# Patient Record
Sex: FEMALE | Race: WHITE | NOT HISPANIC OR LATINO | Employment: FULL TIME | URBAN - METROPOLITAN AREA
[De-identification: names, ages, dates, MRNs, and addresses within clinical notes are randomized per-mention and may not be internally consistent; named-entity substitution may affect disease eponyms.]

---

## 2020-12-04 ENCOUNTER — OFFICE VISIT - HEALTHEAST (OUTPATIENT)
Dept: FAMILY MEDICINE | Facility: CLINIC | Age: 35
End: 2020-12-04

## 2020-12-04 ENCOUNTER — COMMUNICATION - HEALTHEAST (OUTPATIENT)
Dept: TELEHEALTH | Facility: CLINIC | Age: 35
End: 2020-12-04

## 2020-12-04 DIAGNOSIS — N89.8 VAGINAL DISCHARGE: ICD-10-CM

## 2020-12-04 LAB
CLUE CELLS: NORMAL
TRICHOMONAS, WET PREP: NORMAL
YEAST, WET PREP: NORMAL

## 2020-12-04 RX ORDER — SERTRALINE HYDROCHLORIDE 100 MG/1
100 TABLET, FILM COATED ORAL DAILY
Status: SHIPPED | COMMUNITY
Start: 2020-12-04 | End: 2022-04-15

## 2020-12-04 ASSESSMENT — MIFFLIN-ST. JEOR: SCORE: 1373.11

## 2021-04-07 ENCOUNTER — OFFICE VISIT - HEALTHEAST (OUTPATIENT)
Dept: FAMILY MEDICINE | Facility: CLINIC | Age: 36
End: 2021-04-07

## 2021-04-07 DIAGNOSIS — R30.0 DYSURIA: ICD-10-CM

## 2021-04-07 DIAGNOSIS — Z11.3 SCREEN FOR STD (SEXUALLY TRANSMITTED DISEASE): ICD-10-CM

## 2021-04-07 DIAGNOSIS — L21.0 PITYRIASIS IN ADULT: ICD-10-CM

## 2021-04-07 LAB
CLUE CELLS: NORMAL
TRICHOMONAS, WET PREP: NORMAL
YEAST, WET PREP: NORMAL

## 2021-04-07 RX ORDER — DEXTROAMPHETAMINE SACCHARATE, AMPHETAMINE ASPARTATE MONOHYDRATE, DEXTROAMPHETAMINE SULFATE AND AMPHETAMINE SULFATE 5; 5; 5; 5 MG/1; MG/1; MG/1; MG/1
CAPSULE, EXTENDED RELEASE ORAL
Status: SHIPPED | COMMUNITY
Start: 2020-04-21 | End: 2022-04-15

## 2021-04-07 RX ORDER — CLONAZEPAM 1 MG/1
TABLET ORAL
Status: SHIPPED | COMMUNITY
Start: 2020-04-09 | End: 2022-04-15

## 2021-04-09 LAB
C TRACH DNA SPEC QL PROBE+SIG AMP: POSITIVE
N GONORRHOEA DNA SPEC QL NAA+PROBE: POSITIVE

## 2021-04-10 ENCOUNTER — COMMUNICATION - HEALTHEAST (OUTPATIENT)
Dept: SCHEDULING | Facility: CLINIC | Age: 36
End: 2021-04-10

## 2021-04-10 ENCOUNTER — COMMUNICATION - HEALTHEAST (OUTPATIENT)
Dept: FAMILY MEDICINE | Facility: CLINIC | Age: 36
End: 2021-04-10

## 2021-04-10 DIAGNOSIS — Z11.3 SCREEN FOR STD (SEXUALLY TRANSMITTED DISEASE): ICD-10-CM

## 2021-04-10 RX ORDER — DOXYCYCLINE 100 MG/1
100 CAPSULE ORAL 2 TIMES DAILY
Qty: 20 CAPSULE | Refills: 0 | Status: SHIPPED | OUTPATIENT
Start: 2021-04-10 | End: 2022-04-15

## 2021-04-30 ENCOUNTER — AMBULATORY - HEALTHEAST (OUTPATIENT)
Dept: NURSING | Facility: CLINIC | Age: 36
End: 2021-04-30

## 2021-05-17 ENCOUNTER — RECORDS - HEALTHEAST (OUTPATIENT)
Dept: ADMINISTRATIVE | Facility: OTHER | Age: 36
End: 2021-05-17

## 2021-05-18 ENCOUNTER — RECORDS - HEALTHEAST (OUTPATIENT)
Dept: SCHEDULING | Facility: CLINIC | Age: 36
End: 2021-05-18

## 2021-06-05 VITALS
DIASTOLIC BLOOD PRESSURE: 60 MMHG | SYSTOLIC BLOOD PRESSURE: 120 MMHG | WEIGHT: 124 LBS | OXYGEN SATURATION: 97 % | HEART RATE: 123 BPM | BODY MASS INDEX: 19.42 KG/M2

## 2021-06-05 VITALS
HEART RATE: 112 BPM | OXYGEN SATURATION: 92 % | WEIGHT: 141.2 LBS | BODY MASS INDEX: 22.16 KG/M2 | SYSTOLIC BLOOD PRESSURE: 132 MMHG | HEIGHT: 67 IN | DIASTOLIC BLOOD PRESSURE: 74 MMHG

## 2021-06-13 NOTE — PROGRESS NOTES
"ASSESSMENT:  1. Vaginal discharge    Reassured her there is no evidence of a bacterial infection.  We did discuss the possiblity of a foreign body/retained tampon.  We offered to explore for that, but patient wants to go home and do this herself.  If she is continuing to have problems and can't find anything there, she will let us know or come back.    - Wet Prep, Vaginal          PLAN:  There are no Patient Instructions on file for this visit.    Orders Placed This Encounter   Procedures     Wet Prep, Vaginal     There are no discontinued medications.    No follow-ups on file.    CHIEF COMPLAINT:  Chief Complaint   Patient presents with     Vaginal Discharge     odor, cloudy urine, extra discharge - greyish - x1-2 weeks - had gone away for a bit and now back - tried a one day monostat OTC       HISTORY OF PRESENT ILLNESS:  Lala is a 35 y.o. female presenting to the clinic today with concerns of a vaginal discharge.  She has had a grey thin discharge and a lot of odor over the last few days.  No pain noted.  No dysuria or frequency of urination.  No hematuria.  She tried some otc monistat without help.    REVIEW OF SYSTEMS:     All other systems are negative.    PFSH:    Reviewed      TOBACCO USE:  Social History     Tobacco Use   Smoking Status Never Smoker   Smokeless Tobacco Never Used       VITALS:  Vitals:    12/04/20 1412   BP: 132/74   Patient Site: Left Arm   Patient Position: Sitting   Cuff Size: Adult Regular   Pulse: (!) 112   SpO2: 92%   Weight: 141 lb 3.2 oz (64 kg)   Height: 5' 7\" (1.702 m)     Wt Readings from Last 3 Encounters:   12/04/20 141 lb 3.2 oz (64 kg)     Body mass index is 22.12 kg/m .    PHYSICAL EXAM:  Constitutional:  Well appearing patient in no acute distress.  Vitals:  Per nursing notes.    HEENT:  Normocephalic, atraumatic.  Ears are clear bilaterally, with no fluid or redness, and landmarks visible.  Pupils are equal and reactive to light, extraocular muscles intact, visual " fields are full.  Nose is normal, and oropharynx is clear without redness.    Neck is without lymphadenopathy.    Lungs:  Clear to auscultation bilaterally without wheezes, rales or rhonchi.   Cardiac:  Regular rate and rhythm without murmurs, rubs, or gallops.     .  Recent Results (from the past 120 hour(s))   Wet Prep, Vaginal    Collection Time: 12/04/20  2:25 PM    Specimen: Genital   Result Value Ref Range    Yeast Result No yeast seen No yeast seen    Trichomonas No Trichomonas seen No Trichomonas seen    Clue Cells, Wet Prep No Clue cells seen No Clue cells seen          MEDICATIONS:  Current Outpatient Medications   Medication Sig Dispense Refill     dextroamphetamine-amphetamine (ADDERALL XR) 20 MG 24 hr capsule Take 20 mg by mouth daily.       sertraline (ZOLOFT) 100 MG tablet Take 100 mg by mouth daily.       No current facility-administered medications for this visit.

## 2021-06-16 NOTE — TELEPHONE ENCOUNTER
4/7/2 pt seen by Dr. Trinidad in clinic.  Pt calling back that her test results are positive for Chlamydia and Gonorrheae and pt calling for treatment.  On call paged @ 12:28 pm.  On call re paged @ Dr. Fernando Leyva returned page @ 1:03 pm  Rx Zpac and Doxycycline sent to Pharmacy.  Pt to get a hold of all contacts to inform.  Pt to f/u with clinic if any persisting symptoms.  Pt to refrain from intercourse until treatment completed.  This information called to pt.  Erna Ann RN, MA  [facility]    Triage Nurse Advisor

## 2021-06-16 NOTE — TELEPHONE ENCOUNTER
Patient calling reporting she saw her lab results for Chlamydia and Gonorrhoeae. Patient notes she is positive for both STD. Given that is late at night, RN advised patient to call back in the morning for an on call provider for her treatment plan. Verbalized understanding.     Azael Singh RN  [facility] Nurse Advisors

## 2021-06-16 NOTE — PROGRESS NOTES
Lala was seen today for vaginal discharge.    Diagnoses and all orders for this visit:    Pityriasis in adult:  Discussed this diagnosis in detail.  It is not bothersome currently.  I discussed that it should not further spread, but resolve on its own over time.    Screen for STD (sexually transmitted disease)  -     Chlamydia trachomatis & Neisseria gonorrhoeae, Amplified Detection  -     Wet Prep, Vaginal  I discussed STDs that could be tested for today.  Patient declines blood work for HIV, hepatitis C, and syphilis today.  She would like gonorrhea and Chlamydia testing.  Will await results and notify patient.      Subjective   Lala Calvin is 35 y.o. and presents today for the following health issues   HPI   1)  Rash up her legs x 2 weeks.  She had a virtual visit 1.5 weeks ago and got prednisone.  Did not do anything.  Does not really itch.  No new soaps lotions or creams.  She stopped using bubble bath.  Started with a patch left inner thigh and spread from there.  Mostly on the extensor surface.  Has not spread since.  Not bothersome other than appearance.    2) STD screening.  Patient got  in September and recently moved.  She has been sexually active and would like STD testing.  She has only a little bit of vaginal discharge.  She is not had any lesions.  When asked about birth control, patient notes that she was diagnosed with polycystic ovarian syndrome and female infertility.  She is not used any birth control.  She was seen by [facility] for her gynecologic care in the past.  She notes that her last Pap smear was in 2019 and was normal.      Objective    /60   Pulse (!) 123   Wt 124 lb (56.2 kg)   SpO2 97%   BMI 19.42 kg/m    Body mass index is 19.42 kg/m .  Physical Exam  Gen: A+O x3,NAD  GYNE: Normal appearing external genitalia without lesions.  Vagina reveals only scant amount of discharge.  Cervix is normal-appearing.  No tenderness to palpation, no adnexal tenderness, no cervical  motion tenderness.    Microscopic wet-mount exam shows: negative for yeast, trichomonas, clue cells.

## 2021-06-17 NOTE — TELEPHONE ENCOUNTER
RN cannot approve Refill Request    RN can NOT refill this medication No established PCP. Last office visit: 12/4/2020 Fernando Leyva MD Last Physical: Visit date not found Last MTM visit: Visit date not found Last visit same specialty: Visit date not found.  Next visit within 3 mo: Visit date not found  Next physical within 3 mo: Visit date not found      Erna Ann, [facility] Triage/Med Refill 5/18/2021    Requested Prescriptions   Pending Prescriptions Disp Refills     doxycycline (MONODOX) 100 MG capsule 20 capsule 0     Sig: Take 1 capsule (100 mg total) by mouth 2 (two) times a day.       Tetracycline/Minocycline/Doxycycline Refill Protocol Failed - 5/17/2021 11:11 PM        Failed - CBC in last year     No results found for: WBC, HGB, HCT, PLT          Failed - AST & Alkaline Phosphatase in last year     No results found for: AST, ALKPHOS            Failed - BUN or Serum Creatinine in last year     No results found for: BUN, CREATININE             Passed - PCP or prescribing provider visit in last year     Last office visit with prescriber/PCP: 12/4/2020 Fernando Leyva MD OR same dept: Visit date not found OR same specialty: Visit date not found  Last physical: Visit date not found Last MTM visit: Visit date not found   Next visit within 3 mo: Visit date not found  Next physical within 3 mo: Visit date not found  Prescriber OR PCP: Fernando Leyva MD  Last diagnosis associated with med order: 1. Screen for STD (sexually transmitted disease)  - doxycycline (MONODOX) 100 MG capsule; Take 1 capsule (100 mg total) by mouth 2 (two) times a day.  Dispense: 20 capsule; Refill: 0    If protocol passes may refill for 12 months if within 3 months of last provider visit (or a total of 15 months).

## 2021-08-03 ENCOUNTER — E-VISIT (OUTPATIENT)
Dept: FAMILY MEDICINE | Facility: CLINIC | Age: 36
End: 2021-08-03
Payer: COMMERCIAL

## 2021-08-03 DIAGNOSIS — Z30.09 ENCOUNTER FOR OTHER GENERAL COUNSELING OR ADVICE ON CONTRACEPTION: Primary | ICD-10-CM

## 2021-08-03 PROCEDURE — 99207 PR NON-BILLABLE SERV PER CHARTING: CPT | Performed by: FAMILY MEDICINE

## 2021-08-05 NOTE — PATIENT INSTRUCTIONS
Thank you for choosing us for your care. I think an in-clinic visit would be best next steps based on your symptoms. Please schedule a clinic appointment; you won t be charged for this eVisit.      You can schedule an appointment right here in [facility], or call [phone]

## 2021-08-21 ENCOUNTER — E-VISIT (OUTPATIENT)
Dept: URGENT CARE | Facility: CLINIC | Age: 36
End: 2021-08-21
Payer: COMMERCIAL

## 2021-08-21 ENCOUNTER — HEALTH MAINTENANCE LETTER (OUTPATIENT)
Age: 36
End: 2021-08-21

## 2021-08-21 DIAGNOSIS — N39.0 ACUTE UTI (URINARY TRACT INFECTION): Primary | ICD-10-CM

## 2021-08-21 PROCEDURE — 99421 OL DIG E/M SVC 5-10 MIN: CPT | Performed by: PHYSICIAN ASSISTANT

## 2021-08-21 RX ORDER — NITROFURANTOIN 25; 75 MG/1; MG/1
100 CAPSULE ORAL 2 TIMES DAILY
Qty: 10 CAPSULE | Refills: 0 | Status: SHIPPED | OUTPATIENT
Start: 2021-08-21 | End: 2021-08-26

## 2021-08-21 NOTE — PATIENT INSTRUCTIONS
Dear Lala Calvin    After reviewing your responses, I've been able to diagnose you with a urinary tract infection, which is a common infection of the bladder with bacteria.  This is not a sexually transmitted infection, though urinating immediately after intercourse can help prevent infections.  Drinking lots of fluids is also helpful to clear your current infection and prevent the next one.      I have sent a prescription for antibiotics to your pharmacy to treat this infection.    It is important that you take all of your prescribed medication even if your symptoms are improving after a few doses.  Taking all of your medicine helps prevent the symptoms from returning.     If your symptoms worsen, you develop pain in your back or stomach, develop fevers, or are not improving in 5 days, please contact your primary care provider for an appointment or visit any of our convenient Walk-in or Urgent Care Centers to be seen, which can be found on our website here.    Thanks again for choosing us as your health care partner,    Kary Reese PA-C, ANGELINE

## 2021-09-05 ENCOUNTER — HEALTH MAINTENANCE LETTER (OUTPATIENT)
Age: 36
End: 2021-09-05

## 2021-10-10 ENCOUNTER — NURSE TRIAGE (OUTPATIENT)
Dept: NURSING | Facility: CLINIC | Age: 36
End: 2021-10-10

## 2021-10-10 NOTE — TELEPHONE ENCOUNTER
Stanley (father) calls and says that he wants to know if there are beds available at [facility] on the Detox unit. RN then told Stanley that he needs to call the main # to the [facility] and ask the  to transfer you to the Detox floor, for further assistance . Stanlye says that he will do this. RN gave Stanley the phone # to the [facility]. COVID 19 Nurse Triage Plan/Patient Instructions    Please be aware that novel coronavirus (COVID-19) may be circulating in the community. If you develop symptoms such as fever, cough, or SOB or if you have concerns about the presence of another infection including coronavirus (COVID-19), please contact your health care provider or visit [email or web address].     Disposition/Instructions    Home care recommended. Follow home care protocol based instructions.    Thank you for taking steps to prevent the spread of this virus.  o Limit your contact with others.  o Wear a simple mask to cover your cough.  o Wash your hands well and often.    Resources    [facility]: About COVID-19: [email or web address]    CDC: What to Do If You're Sick: www.cdc.gov/coronavirus/2019-ncov/about/steps-when-sick.html    CDC: Ending Home Isolation: www.cdc.gov/coronavirus/2019-ncov/hcp/disposition-in-home-patients.html     CDC: Caring for Someone: www.cdc.gov/coronavirus/2019-ncov/if-you-are-sick/care-for-someone.html     [facility]: Interim Guidance for Hospital Discharge to Home: www.health.[facility].mn.us/diseases/coronavirus/hcp/hospdischarge.pdf    [facility] clinical trials (COVID-19 research studies): [email or web address]     Below are the COVID-19 hotlines at the [facility] ([facility]). Interpreters are available.   o For health questions: Call 863-484-3962 or 1-225.169.7676 (7 a.m. to 7 p.m.)  o For questions about schools and childcare: Call 480-545-3450 or 1-587.596.5153 (7 a.m. to 7 p.m.)                   Reason  for Disposition    General information question, no triage required and triager able to answer question    Additional Information    Negative: [1] Caller is not with the adult (patient) AND [2] reporting urgent symptoms    Negative: Lab result questions    Negative: Medication questions    Negative: Caller can't be reached by phone    Negative: Caller has already spoken to PCP or another triager    Negative: RN needs further essential information from caller in order to complete triage    Negative: Requesting regular office appointment    Negative: [1] Caller requesting NON-URGENT health information AND [2] PCP's office is the best resource    Negative: Health Information question, no triage required and triager able to answer question    Protocols used: INFORMATION ONLY CALL - NO TRIAGE-A-

## 2021-10-16 ENCOUNTER — E-VISIT (OUTPATIENT)
Dept: URGENT CARE | Facility: CLINIC | Age: 36
End: 2021-10-16
Payer: COMMERCIAL

## 2021-10-16 DIAGNOSIS — B35.3 TINEA PEDIS OF BOTH FEET: Primary | ICD-10-CM

## 2021-10-16 PROCEDURE — 99421 OL DIG E/M SVC 5-10 MIN: CPT | Performed by: PHYSICIAN ASSISTANT

## 2021-10-16 RX ORDER — PRENATAL VIT 91/IRON/FOLIC/DHA 28-975-200
COMBINATION PACKAGE (EA) ORAL 2 TIMES DAILY
Qty: 30 G | Refills: 1 | Status: SHIPPED | OUTPATIENT
Start: 2021-10-16 | End: 2022-04-15

## 2021-10-16 NOTE — PATIENT INSTRUCTIONS
"  Dear Lala Calvin    After reviewing your responses, I've been able to diagnose you with \"tinea\" which is a common skin condition caused by a fungal infection. There are many common names for this depending on where it is located and it's appearance (jock itch, athlete's foot, ringworm, etc).  Based on your responses, I have prescribed terbinafine to treat this. Please follow the instructions on the medication. If you experience irritation of your skin, new rash, or any other new symptoms, you should stop using this medication and contact your primary care provider.  If this treatment does not work for you in 2 weeks or after completing treatment, please plan to follow-up with your primary care provider to evaluate in-person.  Self-care:  Wash all items that come into contact with infected skin: Wash all towels, clothes, and bedding in hot water. Use laundry soap. Clean shower stalls, mats, and floors with a germ-killing or fungus-killing .   Do not share personal items: Do not share towels, brushes, nick, or hair accessories.    Keep your skin, hair, and nails clean and dry: Bathe every day, and dry your skin before you put medicine on the infected area. Wash your hands often. Do not scratch your sores. This may cause the infection to spread.   Avoid infected pets: A patch of missing fur is a sign of infection in a pet. Take your infected pet to a  for treatment.  Contact your primary healthcare provider if:  You have a fever.    Your infection continues to spread after 7 days of treatment.   Your rash is not gone in 2 weeks.   The area around your rash becomes red, warm, tender, swollen, or smells bad.   You have questions or concerns about your condition or care.    Thanks for choosing?us?as your health care partner,    Mayela Garcia PA-C  "

## 2021-11-23 ENCOUNTER — NURSE TRIAGE (OUTPATIENT)
Dept: NURSING | Facility: CLINIC | Age: 36
End: 2021-11-23
Payer: COMMERCIAL
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
## 2022-04-14 NOTE — PROGRESS NOTES
"[facility] PHYSICAL EXAMINATION FORM    Patient: Lala Calvin    YOB: 1985  Sex:  female  Date of Exam: April/15/2022   MyChart Status: Active    Arrival Time: 01 34 PM  Departure Time: 03 00 PM    Vitals: /57   Pulse 76   Temp 98.1  F (36.7  C) (Oral)   Ht 1.707 m (5' 7.2\")   Wt 59 kg (130 lb)   SpO2 100%   BMI 20.24 kg/m      Patient Concerns:   Chief Complaint   Patient presents with     Physical     HPI    Lala Calvin presents to the clinic today for a [facility] physical exam. They have been there since 3/15/22. This is not the patient's first time in treatment. Reports their drug of choice is/was alcohol , heroin  and methamphetamine, last used 3/9/22. Patient endorses a history of IVDU and denies a history of incarceration. Patient's living situation prior to treatment: lives alone. Patient denies any recent travel. Report their last episode of [facility] was 3/8/22, no active genitourinary symptoms. Lala Calvin denies current chest pain, palpitations, SOB, cough, fever, chills, malaise, N/V/D, night sweats, unintentional weight loss, or abdominal pain.     Current health complaints/updates:   - Requesting prescriptions for over-the-counter medications including ibuprofen for intermittent tension headaches, melatonin for insomnia, and a multivitamin for general wellness  - OB/GYN appointment scheduled for 4/24 for pap, breast exam, & f/u on PCOS hx  - Due for second dose of Tdap today, would like to complete  - Otherwise feeling in good state of health  - Psychiatric care and medication management by psychiatrist at [facility]. Patient reports she would like to wean off of trazodone eventually to reduce amount of medications taken daily. Has been taking 50 mg daily for insomnia. Would like to try melatonin.     Allergies:   Allergies   Allergen Reactions     No Known Drug Allergies      Ragweeds      Seasonal Allergies      Medications:   Current Outpatient " Medications   Medication Sig Dispense Refill     atomoxetine (STRATTERA) 60 MG capsule Take 60 mg by mouth every morning       buprenorphine HCl-naloxone HCl (SUBOXONE) 8-2 MG per film Place 1 Film under the tongue daily Dissolve 1 film under the tongue everyday at noon and dissolve 1/film under the tongue every night at bedtime       escitalopram (LEXAPRO) 20 MG tablet Take 20 mg by mouth daily       gabapentin (NEURONTIN) 300 MG capsule Take 300 mg by mouth 2 times daily Take 1 capsule by mouth twice a day (4pm and every night at bedtime)       ibuprofen (ADVIL/MOTRIN) 200 MG capsule Take 1-3 capsules (200-600 mg) by mouth every 4 hours as needed for mild pain, moderate pain or inflammatory pain 90 capsule 3     melatonin 3 MG tablet Take 1-2 tablets (3-6 mg) by mouth nightly as needed for sleep 90 tablet 3     multivitamin, therapeutic (MULTIVITAMIN) TABS tablet Take 1 tablet by mouth daily 90 tablet 3     nicotine (NICODERM CQ) 21 MG/24HR 24 hr patch Place 1 patch onto the skin every 24 hours Apply 1 patch to skin daily, remove at bedtime       polyethylene glycol 3350 POWD daily as needed Mix 34 grams with 8 ounces of fluid and drink daily as needed       senna (SENOKOT) 8.6 MG tablet Take 1 tablet by mouth 2 times daily as needed for constipation       traZODone (DESYREL) 50 MG tablet Take 0.5-1 tablets (25-50 mg) by mouth nightly as needed for sleep Take 1-2 tablets by mouth every night at bedtime as needed 30 tablet 0     ROS:  Review Of Systems  Skin: negative  Eyes: negative  Ears/Nose/Throat: negative  Respiratory: No shortness of breath, dyspnea on exertion, cough, or hemoptysis  Cardiovascular: negative for, palpitations, chest pain and lower extremity edema  Gastrointestinal: negative  Genitourinary: negative  Musculoskeletal: negative  Neurologic: positive for intermittent headaches, otherwise negative  Psychiatric: positive for insomnia, otherwise negative  Hematologic/Lymphatic/Immunologic:  negative  Endocrine: negative    Past Medical, social, family histories, medications, and allergies reviewed and updated.    General Physical Exam:  Constitutional: Awake, alert, cooperative, no apparent distress, and appears stated age.  Eyes: Lids and lashes normal, pupils equal, round and reactive to light, extra ocular muscles intact, sclera clear, conjunctiva normal.  ENT: Normocephalic, without obvious abnormality, atramatic, sinuses nontender on palpation, external ears without lesions, oral pharynx with moist mucus membranes, tonsils enlarged +3 without erythema or exudates, gums normal and good dentition.  Neck: Supple, symmetrical, trachea midline, no adenopathy, thyroid symmetric, not enlarged and no tenderness, skin normal.  Hematologic / Lymphatic: No cervical lymphadenopathy and no supraclavicular lymphadenopathy.  Back: Symmetric, no curvature, no deformity  Lungs: No increased work of breathing, good air exchange, clear to auscultation bilaterally, no crackles or wheezing.  Cardiovascular: Regular rate and rhythm, normal S1 and S2, no S3 or S4, and no murmur noted.  Chest / Breast: Patient declined, will f/u with OB/GYN  Abdomen: No scars, normal bowel sounds, soft, non-distended, non-tender, no masses palpated, no hepatosplenomegally.  Genitourinary: Patient declined, will f/u with OB/GYN  Musculoskeletal: No redness, warmth, or swelling of the joints.  Full range of motion noted.  Motor strength is 5 out of 5 all extremities bilaterally. Tone is normal.  Neurologic: Awake, alert, oriented to name, place and time. Cranial nerves II-XII are grossly intact.  Motor is 5 out of 5 bilaterally. Sensory is intact. Gait is normal.  Neuropsychiatric: Normal affect, mood, orientation, memory and insight.  Skin: No rashes, erythema, pallor, petechia or purpura. Tattoos present. Multiple piercings present.    All labs required for [facility] will be completed during this visit: HIV, Hepatitis A (IgM &IgG), Hepatitis  B (IgM &IgG), Hepatitis C, Quantiferon Gold, Pregnancy Test     Are there any conditions that may endanger the health of the staff or Clients in our residential program? No     Is there any reason why this applicant should not assist in the preparation of food? No    This applicant is okay to admit for services to [facility]? Yes     The above client is a mutual patient at [facility], and the [facility] would like our patient to continue taking her medication as prescribed upon discharging from [facility].     I authorize the above patient to take all of her medication with her upon discharging from [facility]. Yes     Diagnoses:     (Z00.00) Health maintenance examination  (primary encounter diagnosis)  Comment: Routine screening completed as indicated per USPSTF guidelines & patient's pertinent health risks.  Plan: Comprehensive metabolic panel, CBC with         Platelets & Differential, Lipid panel reflex to        direct LDL Non-fasting, multivitamin,         therapeutic (MULTIVITAMIN) TABS tablet, TDAP         VACCINE (Adacel, Boostrix)  [[id number]]    (Z59.89) Living in temporary quarters  Comment: Screening for communicable diseases given patient living in communal housing  Plan: Quantiferon-TB Gold Plus, Hepatitis A Antibody         IgG, Hepatitis A antibody IgM    (F19.90) IVDU (intravenous drug user)  Comment: Screening for blood-borne pathogens given history of IVDU  Plan: Hepatitis C Screen Reflex to HCV RNA Quant and         Genotype, Hepatitis B core antibody, Hepatitis         B surface antigen, Hepatitis B Surface Antibody    (Z11.3) Routine screening for STI (sexually transmitted infection)  Comment: Screening for STI given recent [facility] and personal hx of STI  Plan: HIV Antigen Antibody Combo, Treponema Abs w         Reflex to RPR and Titer, NEISSERIA GONORRHOEA          PCR, CHLAMYDIA TRACHOMATIS PCR    (Z72.51) Unprotected sexual intercourse  Comment: Patient has hx of PCOS and ED, does not get regular menses. Will screen for pregnancy today.   Plan: HCG qualitative urine    (G47.00) Insomnia, unspecified type  Comment: Patient expressed that she would like to wean off of trazodone. Requesting melatonin prescription. Changed trazodone from  HS PRN to 25-50 HS PRN - given 30-day amount and instructed patient to follow-up with psychiatrist for further management (patient established with [facility]).  Plan: melatonin 3 MG tablet, traZODone (DESYREL) 50         MG tablet    (G44.219) Episodic tension-type headache, not intractable  Comment: Patient gets headaches intermittently. Responds well to ibuprofen and requesting prescription due to requiring Rx for OTC medications while living in treatment facility.   Plan: ibuprofen (ADVIL/MOTRIN) 200 MG capsule    Medication Changes:   MEDICATIONS:   Orders Placed This Encounter   Medications     DISCONTD: traZODone (DESYREL) 50 MG tablet     Sig: Take 50 mg by mouth nightly as needed for sleep Take 1-2 tablets by mouth every night at bedtime as needed     ibuprofen (ADVIL/MOTRIN) 200 MG capsule     Sig: Take 1-3 capsules (200-600 mg) by mouth every 4 hours as needed for mild pain, moderate pain or inflammatory pain     Dispense:  90 capsule     Refill:  3     melatonin 3 MG tablet     Sig: Take 1-2 tablets (3-6 mg) by mouth nightly as needed for sleep     Dispense:  90 tablet     Refill:  3     multivitamin, therapeutic (MULTIVITAMIN) TABS tablet     Sig: Take 1 tablet by mouth daily     Dispense:  90 tablet     Refill:  3     traZODone (DESYREL) 50 MG tablet     Sig: Take 0.5-1 tablets (25-50 mg) by mouth nightly as needed for sleep Take 1-2 tablets by mouth every night at bedtime as needed     Dispense:  30 tablet     Refill:  0          - Continue other medications without change    Referrals   NO REFERRALS MADE  TODAY    Follow up plan   Follow up as needed.      Bharati Morales, SANTOS CNP     Fax completed forms to: [phone]

## 2022-04-15 ENCOUNTER — OFFICE VISIT (OUTPATIENT)
Dept: FAMILY MEDICINE | Facility: CLINIC | Age: 37
End: 2022-04-15
Payer: COMMERCIAL

## 2022-04-15 VITALS
HEART RATE: 76 BPM | TEMPERATURE: 98.1 F | HEIGHT: 67 IN | BODY MASS INDEX: 20.4 KG/M2 | SYSTOLIC BLOOD PRESSURE: 100 MMHG | WEIGHT: 130 LBS | OXYGEN SATURATION: 100 % | DIASTOLIC BLOOD PRESSURE: 57 MMHG

## 2022-04-15 DIAGNOSIS — G44.219 EPISODIC TENSION-TYPE HEADACHE, NOT INTRACTABLE: ICD-10-CM

## 2022-04-15 DIAGNOSIS — G47.00 INSOMNIA, UNSPECIFIED TYPE: ICD-10-CM

## 2022-04-15 DIAGNOSIS — Z00.00 HEALTH MAINTENANCE EXAMINATION: Primary | ICD-10-CM

## 2022-04-15 DIAGNOSIS — Z11.3 ROUTINE SCREENING FOR STI (SEXUALLY TRANSMITTED INFECTION): ICD-10-CM

## 2022-04-15 DIAGNOSIS — Z72.51 UNPROTECTED SEXUAL INTERCOURSE: ICD-10-CM

## 2022-04-15 DIAGNOSIS — Z59.89 LIVING IN TEMPORARY QUARTERS: ICD-10-CM

## 2022-04-15 DIAGNOSIS — F19.90 IVDU (INTRAVENOUS DRUG USER): ICD-10-CM

## 2022-04-15 LAB
ALBUMIN SERPL-MCNC: 3.4 G/DL (ref 3.4–5)
ALP SERPL-CCNC: 45 U/L (ref 40–150)
ALT SERPL W P-5'-P-CCNC: 18 U/L (ref 0–50)
ANION GAP SERPL CALCULATED.3IONS-SCNC: 6 MMOL/L (ref 3–14)
AST SERPL W P-5'-P-CCNC: 19 U/L (ref 0–45)
BASOPHILS # BLD AUTO: 0 10E3/UL (ref 0–0.2)
BASOPHILS NFR BLD AUTO: 1 %
BILIRUB SERPL-MCNC: 0.4 MG/DL (ref 0.2–1.3)
BUN SERPL-MCNC: 9 MG/DL (ref 7–30)
CALCIUM SERPL-MCNC: 9.2 MG/DL (ref 8.5–10.1)
CHLORIDE BLD-SCNC: 102 MMOL/L (ref 94–109)
CHOLEST SERPL-MCNC: 143 MG/DL
CO2 SERPL-SCNC: 30 MMOL/L (ref 20–32)
CREAT SERPL-MCNC: 0.7 MG/DL (ref 0.52–1.04)
EOSINOPHIL # BLD AUTO: 0.1 10E3/UL (ref 0–0.7)
EOSINOPHIL NFR BLD AUTO: 2 %
ERYTHROCYTE [DISTWIDTH] IN BLOOD BY AUTOMATED COUNT: 12.2 % (ref 10–15)
FASTING STATUS PATIENT QL REPORTED: NO
GFR SERPL CREATININE-BSD FRML MDRD: >90 ML/MIN/1.73M2
GLUCOSE BLD-MCNC: 96 MG/DL (ref 70–99)
HCG UR QL: NEGATIVE
HCT VFR BLD AUTO: 34.4 % (ref 35–47)
HDLC SERPL-MCNC: 47 MG/DL
HGB BLD-MCNC: 11 G/DL (ref 11.7–15.7)
IMM GRANULOCYTES # BLD: 0 10E3/UL
IMM GRANULOCYTES NFR BLD: 0 %
LDLC SERPL CALC-MCNC: 69 MG/DL
LYMPHOCYTES # BLD AUTO: 2.1 10E3/UL (ref 0.8–5.3)
LYMPHOCYTES NFR BLD AUTO: 30 %
MCH RBC QN AUTO: 27.4 PG (ref 26.5–33)
MCHC RBC AUTO-ENTMCNC: 32 G/DL (ref 31.5–36.5)
MCV RBC AUTO: 86 FL (ref 78–100)
MONOCYTES # BLD AUTO: 0.6 10E3/UL (ref 0–1.3)
MONOCYTES NFR BLD AUTO: 9 %
NEUTROPHILS # BLD AUTO: 4.1 10E3/UL (ref 1.6–8.3)
NEUTROPHILS NFR BLD AUTO: 58 %
NONHDLC SERPL-MCNC: 96 MG/DL
NRBC # BLD AUTO: 0 10E3/UL
NRBC BLD AUTO-RTO: 0 /100
PLATELET # BLD AUTO: 319 10E3/UL (ref 150–450)
POTASSIUM BLD-SCNC: 3.6 MMOL/L (ref 3.4–5.3)
PROT SERPL-MCNC: 7.8 G/DL (ref 6.8–8.8)
RBC # BLD AUTO: 4.01 10E6/UL (ref 3.8–5.2)
SODIUM SERPL-SCNC: 138 MMOL/L (ref 133–144)
TRIGL SERPL-MCNC: 137 MG/DL
WBC # BLD AUTO: 7 10E3/UL (ref 4–11)

## 2022-04-15 PROCEDURE — 87389 HIV-1 AG W/HIV-1&-2 AB AG IA: CPT | Performed by: NURSE PRACTITIONER

## 2022-04-15 PROCEDURE — 86708 HEPATITIS A ANTIBODY: CPT | Performed by: NURSE PRACTITIONER

## 2022-04-15 PROCEDURE — 87591 N.GONORRHOEAE DNA AMP PROB: CPT | Performed by: NURSE PRACTITIONER

## 2022-04-15 PROCEDURE — 86481 TB AG RESPONSE T-CELL SUSP: CPT | Performed by: NURSE PRACTITIONER

## 2022-04-15 PROCEDURE — 80061 LIPID PANEL: CPT | Performed by: NURSE PRACTITIONER

## 2022-04-15 PROCEDURE — 86803 HEPATITIS C AB TEST: CPT | Performed by: NURSE PRACTITIONER

## 2022-04-15 PROCEDURE — 85025 COMPLETE CBC W/AUTO DIFF WBC: CPT | Performed by: NURSE PRACTITIONER

## 2022-04-15 PROCEDURE — 80053 COMPREHEN METABOLIC PANEL: CPT | Performed by: NURSE PRACTITIONER

## 2022-04-15 PROCEDURE — 86704 HEP B CORE ANTIBODY TOTAL: CPT | Performed by: NURSE PRACTITIONER

## 2022-04-15 PROCEDURE — 87491 CHLMYD TRACH DNA AMP PROBE: CPT | Performed by: NURSE PRACTITIONER

## 2022-04-15 PROCEDURE — 86709 HEPATITIS A IGM ANTIBODY: CPT | Performed by: NURSE PRACTITIONER

## 2022-04-15 PROCEDURE — 86706 HEP B SURFACE ANTIBODY: CPT | Performed by: NURSE PRACTITIONER

## 2022-04-15 PROCEDURE — 86780 TREPONEMA PALLIDUM: CPT | Performed by: NURSE PRACTITIONER

## 2022-04-15 PROCEDURE — 87340 HEPATITIS B SURFACE AG IA: CPT | Performed by: NURSE PRACTITIONER

## 2022-04-15 RX ORDER — OMEGA-3 FATTY ACIDS/FISH OIL 300-1000MG
200-600 CAPSULE ORAL EVERY 4 HOURS PRN
Qty: 90 CAPSULE | Refills: 3 | Status: SHIPPED | OUTPATIENT
Start: 2022-04-15

## 2022-04-15 RX ORDER — POLYETHYLENE GLYCOL 3350
POWDER (GRAM) MISCELLANEOUS DAILY PRN
COMMUNITY

## 2022-04-15 RX ORDER — NICOTINE 21 MG/24HR
1 PATCH, TRANSDERMAL 24 HOURS TRANSDERMAL EVERY 24 HOURS
COMMUNITY
End: 2022-04-22

## 2022-04-15 RX ORDER — ATOMOXETINE 60 MG/1
60 CAPSULE ORAL EVERY MORNING
COMMUNITY
End: 2022-12-13

## 2022-04-15 RX ORDER — SENNOSIDES A AND B 8.6 MG/1
1 TABLET, FILM COATED ORAL 2 TIMES DAILY PRN
COMMUNITY

## 2022-04-15 RX ORDER — GABAPENTIN 300 MG/1
300 CAPSULE ORAL 2 TIMES DAILY
COMMUNITY

## 2022-04-15 RX ORDER — TRAZODONE HYDROCHLORIDE 50 MG/1
25-50 TABLET, FILM COATED ORAL
Qty: 30 TABLET | Refills: 0 | Status: SHIPPED | OUTPATIENT
Start: 2022-04-15

## 2022-04-15 RX ORDER — ESCITALOPRAM OXALATE 20 MG/1
20 TABLET ORAL DAILY
COMMUNITY

## 2022-04-15 RX ORDER — TRAZODONE HYDROCHLORIDE 50 MG/1
50 TABLET, FILM COATED ORAL
COMMUNITY
End: 2022-04-15

## 2022-04-15 RX ORDER — BUPRENORPHINE AND NALOXONE 8; 2 MG/1; MG/1
1 FILM, SOLUBLE BUCCAL; SUBLINGUAL DAILY
COMMUNITY

## 2022-04-15 RX ORDER — MULTIVITAMIN,THERAPEUTIC
1 TABLET ORAL DAILY
Qty: 90 TABLET | Refills: 3 | Status: SHIPPED | OUTPATIENT
Start: 2022-04-15

## 2022-04-15 RX ORDER — LANOLIN ALCOHOL/MO/W.PET/CERES
3-6 CREAM (GRAM) TOPICAL
Qty: 90 TABLET | Refills: 3 | Status: SHIPPED | OUTPATIENT
Start: 2022-04-15

## 2022-04-15 SDOH — ECONOMIC STABILITY - INCOME SECURITY: OTHER PROBLEMS RELATED TO HOUSING AND ECONOMIC CIRCUMSTANCES: Z59.89

## 2022-04-15 ASSESSMENT — PATIENT HEALTH QUESTIONNAIRE - PHQ9
SUM OF ALL RESPONSES TO PHQ QUESTIONS 1-9: 9
5. POOR APPETITE OR OVEREATING: SEVERAL DAYS

## 2022-04-15 ASSESSMENT — ANXIETY QUESTIONNAIRES
1. FEELING NERVOUS, ANXIOUS, OR ON EDGE: SEVERAL DAYS
6. BECOMING EASILY ANNOYED OR IRRITABLE: SEVERAL DAYS
IF YOU CHECKED OFF ANY PROBLEMS ON THIS QUESTIONNAIRE, HOW DIFFICULT HAVE THESE PROBLEMS MADE IT FOR YOU TO DO YOUR WORK, TAKE CARE OF THINGS AT HOME, OR GET ALONG WITH OTHER PEOPLE: SOMEWHAT DIFFICULT
5. BEING SO RESTLESS THAT IT IS HARD TO SIT STILL: SEVERAL DAYS
GAD7 TOTAL SCORE: 6
3. WORRYING TOO MUCH ABOUT DIFFERENT THINGS: SEVERAL DAYS
2. NOT BEING ABLE TO STOP OR CONTROL WORRYING: SEVERAL DAYS
7. FEELING AFRAID AS IF SOMETHING AWFUL MIGHT HAPPEN: NOT AT ALL

## 2022-04-15 NOTE — NURSING NOTE
"ROOM:2    Preferred Name: Lala     36 year old  Chief Complaint   Patient presents with     Physical       Blood pressure 100/57, pulse 76, temperature 98.1  F (36.7  C), temperature source Oral, height 1.707 m (5' 7.2\"), SpO2 100 %. Body mass index is 19.31 kg/m .  BP completed using cuff size:    There is no problem list on file for this patient.      Wt Readings from Last 2 Encounters:   04/07/21 56.2 kg (124 lb)   12/04/20 64 kg (141 lb 3.2 oz)     BP Readings from Last 3 Encounters:   04/15/22 100/57   04/07/21 120/60   12/04/20 132/74       Allergies   Allergen Reactions     No Known Drug Allergies      Ragweeds      Seasonal Allergies        Current Outpatient Medications   Medication     ADDERALL OR     clonazePAM (KLONOPIN) 1 MG tablet     dextroamphetamine-amphetamine (ADDERALL XR) 20 MG 24 hr capsule     doxycycline (MONODOX) 100 MG capsule     KEFLEX 500 MG OR CAPS     propranoloL (INDERAL LA) 60 mg 24 hr capsule     sertraline (ZOLOFT) 100 MG tablet     terbinafine (LAMISIL) 1 % external cream     ZOLOFT OR     No current facility-administered medications for this visit.       Social History     Tobacco Use     Smoking status: Former Smoker     Smokeless tobacco: Never Used     Tobacco comment: 1/2 PPD   Vaping Use     Vaping Use: Never used   Substance Use Topics     Alcohol use: Not Currently     Alcohol/week: 3.0 - 4.0 standard drinks     Comment: former     Drug use: Not Currently     Types: Cocaine, Methamphetamines, Fentanyl, Amphetamines       Honoring Choices - Health Care Directive Guide offered to patient at time of visit.    Health Maintenance Due   Topic Date Due     PREVENTIVE CARE VISIT  Never done     ADVANCE CARE PLANNING  Never done     HIV SCREENING  Never done     HEPATITIS C SCREENING  Never done     PAP  Never done     COVID-19 Vaccine (2 - Pfizer 3-dose series) 05/21/2021       Immunization History   Administered Date(s) Administered     COVID-19,PF,Pfizer (12+ Yrs) 04/30/2021 "     Influenza Vaccine IM > 6 months Valent IIV4 (Alfuria,Fluzone) 12/09/2020       No results found for: PAP    No lab results found.    No flowsheet data found.    PHQ-9 SCORE 4/15/2022   PHQ-9 Total Score 9       EBENEZER-7 SCORE 4/15/2022   Total Score 6       No flowsheet data found.    David Real    April 15, 2022 1:53 PM

## 2022-04-15 NOTE — PATIENT INSTRUCTIONS
It was very nice to meet you today!    Please reach out with any questions or concerns regarding your care.     I will contact you via a phone call or assist in setting up a follow-up appointment to discuss lab results from blood work done today.     Have a shakira weekend    -Bharati

## 2022-04-16 LAB
C TRACH DNA SPEC QL NAA+PROBE: NEGATIVE
N GONORRHOEA DNA SPEC QL NAA+PROBE: NEGATIVE
T PALLIDUM AB SER QL: NONREACTIVE

## 2022-04-16 ASSESSMENT — ANXIETY QUESTIONNAIRES: GAD7 TOTAL SCORE: 6

## 2022-04-17 LAB
GAMMA INTERFERON BACKGROUND BLD IA-ACNC: 0.02 IU/ML
M TB IFN-G BLD-IMP: NEGATIVE
M TB IFN-G CD4+ BCKGRND COR BLD-ACNC: 9.98 IU/ML
MITOGEN IGNF BCKGRD COR BLD-ACNC: 0 IU/ML
MITOGEN IGNF BCKGRD COR BLD-ACNC: 0.01 IU/ML
QUANTIFERON MITOGEN: 10 IU/ML
QUANTIFERON NIL TUBE: 0.02 IU/ML
QUANTIFERON TB1 TUBE: 0.02 IU/ML
QUANTIFERON TB2 TUBE: 0.03

## 2022-04-18 LAB
HAV IGG SER QL IA: REACTIVE
HAV IGM SERPL QL IA: NONREACTIVE
HBV CORE AB SERPL QL IA: NONREACTIVE
HBV SURFACE AB SERPL IA-ACNC: 79.29 M[IU]/ML
HBV SURFACE AG SERPL QL IA: NONREACTIVE
HCV AB SERPL QL IA: NONREACTIVE
HIV 1+2 AB+HIV1 P24 AG SERPL QL IA: NONREACTIVE

## 2022-04-19 ENCOUNTER — TELEPHONE (OUTPATIENT)
Dept: FAMILY MEDICINE | Facility: CLINIC | Age: 37
End: 2022-04-19
Payer: COMMERCIAL

## 2022-04-19 NOTE — RESULT ENCOUNTER NOTE
Lala is currently a patient at [facility]. Please have her scheduled for a telephone visit follow-up to go over her test results at her earliest convenience. Thank you!     SANTOS Castillo CNP 04/19/2022 7:55 AM   [facility]

## 2022-04-22 ENCOUNTER — VIRTUAL VISIT (OUTPATIENT)
Dept: FAMILY MEDICINE | Facility: CLINIC | Age: 37
End: 2022-04-22
Payer: COMMERCIAL

## 2022-04-22 DIAGNOSIS — F17.211 CIGARETTE NICOTINE DEPENDENCE IN REMISSION: ICD-10-CM

## 2022-04-22 DIAGNOSIS — D64.9 ANEMIA, UNSPECIFIED TYPE: Primary | ICD-10-CM

## 2022-04-22 RX ORDER — NICOTINE 21 MG/24HR
1 PATCH, TRANSDERMAL 24 HOURS TRANSDERMAL EVERY 24 HOURS
Qty: 14 PATCH | Refills: 1 | Status: SHIPPED | OUTPATIENT
Start: 2022-04-22 | End: 2022-04-25 | Stop reason: DRUGHIGH

## 2022-04-22 RX ORDER — FERROUS SULFATE 325(65) MG
325 TABLET ORAL
Qty: 30 TABLET | Refills: 2 | Status: SHIPPED | OUTPATIENT
Start: 2022-04-22

## 2022-04-22 RX ORDER — CLONIDINE HYDROCHLORIDE 0.1 MG/1
TABLET ORAL
COMMUNITY
Start: 2022-04-21 | End: 2022-12-13

## 2022-04-22 NOTE — PROGRESS NOTES
Patient: Lala Calvin YOB: 1985 is a 36 year old who is being evaluated via a billable Telephone visit.        How would you like to obtain your AVS? Mail a copy    Date of Exam: April/22/2022    Please be advised that this client resides in a facility in which narcotic medications are not permitted. If pain management is needed, please prescribe an alternative medication.     Lala Calvin is a 36 year old who presents for the following    Patient presents with:  Results  Medication Request    HPI:  Lala was evaluated by the NP clinic on 4/15/22. She is currently at [facility] treatment [facility] for substance use. Her lab results completed that day were overall within normal range, however, it was found that she has normocytic normochromic anemia with a hemoglobin of 11.0. Per chart review, it appears to have been a chronic issue for the past 10+ years. Patient states that she has never been told to have anemia and has not been on any treatment for it to date. She endorses chronic fatigue/low energy. No chest pain, palpitations, or SOB.     Do you need any refills on your Medications today? Yes: Nicotine patches    Review Of Systems  CONSTITUTIONAL: fatigue, no unexpected change in weight  SKIN: no worrisome rashes, no worrisome moles, no worrisome lesions  EYES: no acute vision problems or changes  ENT: no ear problems, no mouth problems, no throat problems  RESP: no significant cough, no shortness of breath  CV: no chest pain, no palpitations, no new or worsening peripheral edema  GI: no nausea, no vomiting, no constipation, no diarrhea    Objective:  Vitals:  No vitals were obtained today due to virtual visit.    GENERAL: healthy, alert and no distress, PSYCH: Alert and oriented times 3; coherent speech, normal rate and volume, able to articulate logical thoughts, able to abstract reason, no tangential thoughts, no hallucinations or delusions, affect is normal and  RESP: No audible wheeze, no cough. Able to talk in full sentences.  Remainder of exam unable to be completed to due to virtual visit.    Additional Comments:N/A    Assessment / Plan:  Lala was seen today for results and medication request.    Diagnoses and all orders for this visit:    Anemia, unspecified type  -     ferrous sulfate (FEROSUL) 325 (65 Fe) MG tablet; Take 1 tablet (325 mg) by mouth daily (with breakfast) Recommend to take with Vitamin C (orange juice) for better absorption.  -     Hemoglobin and hematocrit; Future  - Recheck H&H in 6 weeks    Cigarette nicotine dependence in remission  -     nicotine (NICODERM CQ) 21 MG/24HR 24 hr patch; Place 1 patch onto the skin every 24 hours Apply 1 patch to skin daily, remove at bedtime.  - Provided with 4 weeks of patches. Patient to set up follow-up appointment for reassessment if desiring continued use.     Referrals Made:   NO REFERRALS MADE TODAY    Please make a nurse visit for a repeat lab drawn in 6 weeks, no sooner than 5/27 to recheck your hemoglobin. Also, please make a telephone follow up with CECY brown TAYLER L in 6-7 weeks following completion of repeat lab work to discuss results and further management.    Medication changed made at today's visit:   MEDICATIONS:   Orders Placed This Encounter   Medications     nicotine (NICODERM CQ) 21 MG/24HR 24 hr patch     Sig: Place 1 patch onto the skin every 24 hours Apply 1 patch to skin daily, remove at bedtime.     Dispense:  14 patch     Refill:  1     Please have patient make appointment for more refills.     ferrous sulfate (FEROSUL) 325 (65 Fe) MG tablet     Sig: Take 1 tablet (325 mg) by mouth daily (with breakfast) Recommend to take with Vitamin C (orange juice) for better absorption.     Dispense:  30 tablet     Refill:  2          - Continue other medications without change    SANTOS Castillo CNP     Appointment Duration: 10 minutes

## 2022-04-22 NOTE — PATIENT INSTRUCTIONS
We discussed today that overall, your blood work results came back NORMAL.     ANEMIA  - I have prescribed ferrous sulfate to treat your anemia. Please take this daily. For added benefit, I recommend taking increasing your vitamin C or taking the medication with orange juice for better absorption. This medication commonly causes constipation so continue to monitor your bowel movements while taking.   - I have placed an order for a repeat check on your hemoglobin and hematocrit in 6 weeks. Please make a nurse lab appointment for 5/27 or later to have this lab drawn.     TOBACCO CESSATION  - I have refilled your prescription for nicotine patches. I have provided 4 weeks of the patches. Please make an appointment to in future if needing to obtain more refills so we can discuss the dosage and efficacy of the patches.     Don't hesitate to reach out with any questions or concerns!     -Bharati

## 2022-04-25 ENCOUNTER — TELEPHONE (OUTPATIENT)
Dept: FAMILY MEDICINE | Facility: CLINIC | Age: 37
End: 2022-04-25
Payer: COMMERCIAL

## 2022-04-25 DIAGNOSIS — F17.211 CIGARETTE NICOTINE DEPENDENCE IN REMISSION: Primary | ICD-10-CM

## 2022-04-25 RX ORDER — NICOTINE 21 MG/24HR
1 PATCH, TRANSDERMAL 24 HOURS TRANSDERMAL EVERY 24 HOURS
Qty: 14 PATCH | Refills: 0 | Status: SHIPPED | OUTPATIENT
Start: 2022-04-25

## 2022-04-25 NOTE — TELEPHONE ENCOUNTER
Medication dose adjustment request reviewed. Patient would like to wean nicotine patch dose down rather than continue with previous 21 mg patch. Rx for 2 weeks of 14 mg and 2 weeks of 7 mg patches sent to preferred pharmacy. Nurse at [facility] treatment facility will be monitoring application of patches and dose reduction.     SANTOS Castillo CNP 04/25/2022 12:01 PM   [facility]

## 2022-04-25 NOTE — TELEPHONE ENCOUNTER
Called Saray at [facility] & notified that the new nicotine patches for patient that they were requesting were sent to the pharmacy by provider.

## 2022-04-25 NOTE — TELEPHONE ENCOUNTER
Saray is a medication coordinator at [facility]. She wants a 7 miligram and 14 milligram patches for two weeks for patient instead of 21 miligrams because patient is lowering her dosage of the nicotine patches.

## 2022-10-23 ENCOUNTER — HEALTH MAINTENANCE LETTER (OUTPATIENT)
Age: 37
End: 2022-10-23

## 2022-11-16 ENCOUNTER — HOSPITAL ENCOUNTER (EMERGENCY)
Facility: CLINIC | Age: 37
Discharge: HOME | End: 2022-11-16
Attending: EMERGENCY MEDICINE | Admitting: EMERGENCY MEDICINE
Payer: COMMERCIAL

## 2022-11-16 VITALS
WEIGHT: 119.4 LBS | HEIGHT: 68 IN | BODY MASS INDEX: 18.1 KG/M2 | RESPIRATION RATE: 18 BRPM | SYSTOLIC BLOOD PRESSURE: 118 MMHG | OXYGEN SATURATION: 97 % | HEART RATE: 109 BPM | DIASTOLIC BLOOD PRESSURE: 54 MMHG | TEMPERATURE: 98 F

## 2022-11-16 DIAGNOSIS — F11.93 OPIOID WITHDRAWAL (H): ICD-10-CM

## 2022-11-16 DIAGNOSIS — U07.1 INFECTION DUE TO 2019 NOVEL CORONAVIRUS: ICD-10-CM

## 2022-11-16 LAB
ALBUMIN UR-MCNC: NEGATIVE MG/DL
ALCOHOL BREATH TEST: 0.11 (ref 0–0.01)
AMPHETAMINES UR QL SCN: ABNORMAL
APPEARANCE UR: CLEAR
ATRIAL RATE - MUSE: 108 BPM
BARBITURATES UR QL: ABNORMAL
BASOPHILS # BLD AUTO: 0 10E3/UL (ref 0–0.2)
BASOPHILS NFR BLD AUTO: 0 %
BENZODIAZ UR QL: ABNORMAL
BILIRUB UR QL STRIP: NEGATIVE
CA-I BLD-MCNC: 4.6 MG/DL (ref 4.4–5.2)
CANNABINOIDS UR QL SCN: ABNORMAL
COCAINE UR QL: ABNORMAL
COLOR UR AUTO: ABNORMAL
CPB POCT: NO
DIASTOLIC BLOOD PRESSURE - MUSE: NORMAL MMHG
EOSINOPHIL # BLD AUTO: 0 10E3/UL (ref 0–0.7)
EOSINOPHIL NFR BLD AUTO: 1 %
ERYTHROCYTE [DISTWIDTH] IN BLOOD BY AUTOMATED COUNT: 13.6 % (ref 10–15)
GLUCOSE BLD-MCNC: 91 MG/DL (ref 70–99)
GLUCOSE UR STRIP-MCNC: NEGATIVE MG/DL
HCG UR QL: NEGATIVE
HCO3 BLDV-SCNC: 25 MMOL/L (ref 21–28)
HCT VFR BLD AUTO: 36.7 % (ref 35–47)
HCT VFR BLD CALC: 39 % (ref 35–47)
HGB BLD-MCNC: 12.7 G/DL (ref 11.7–15.7)
HGB BLD-MCNC: 13.3 G/DL (ref 11.7–15.7)
HGB UR QL STRIP: NEGATIVE
IMM GRANULOCYTES # BLD: 0 10E3/UL
IMM GRANULOCYTES NFR BLD: 0 %
INTERPRETATION ECG - MUSE: NORMAL
KETONES UR STRIP-MCNC: NEGATIVE MG/DL
LEUKOCYTE ESTERASE UR QL STRIP: NEGATIVE
LYMPHOCYTES # BLD AUTO: 2.1 10E3/UL (ref 0.8–5.3)
LYMPHOCYTES NFR BLD AUTO: 45 %
MCH RBC QN AUTO: 28.9 PG (ref 26.5–33)
MCHC RBC AUTO-ENTMCNC: 34.6 G/DL (ref 31.5–36.5)
MCV RBC AUTO: 83 FL (ref 78–100)
MONOCYTES # BLD AUTO: 0.6 10E3/UL (ref 0–1.3)
MONOCYTES NFR BLD AUTO: 12 %
NEUTROPHILS # BLD AUTO: 1.9 10E3/UL (ref 1.6–8.3)
NEUTROPHILS NFR BLD AUTO: 42 %
NITRATE UR QL: NEGATIVE
NRBC # BLD AUTO: 0 10E3/UL
NRBC BLD AUTO-RTO: 0 /100
OPIATES UR QL SCN: ABNORMAL
P AXIS - MUSE: 66 DEGREES
PCO2 BLDV: 38 MM HG (ref 40–50)
PH BLDV: 7.43 [PH] (ref 7.32–7.43)
PH UR STRIP: 7.5 [PH] (ref 5–7)
PLATELET # BLD AUTO: 310 10E3/UL (ref 150–450)
PO2 BLDV: 39 MM HG (ref 25–47)
POTASSIUM BLD-SCNC: 3.4 MMOL/L (ref 3.4–5.3)
PR INTERVAL - MUSE: 130 MS
QRS DURATION - MUSE: 72 MS
QT - MUSE: 350 MS
QTC - MUSE: 469 MS
R AXIS - MUSE: 79 DEGREES
RBC # BLD AUTO: 4.4 10E6/UL (ref 3.8–5.2)
SAO2 % BLDV: 75 % (ref 94–100)
SARS-COV-2 RNA RESP QL NAA+PROBE: POSITIVE
SODIUM BLD-SCNC: 145 MMOL/L (ref 133–144)
SP GR UR STRIP: 1.01 (ref 1–1.03)
SYSTOLIC BLOOD PRESSURE - MUSE: NORMAL MMHG
T AXIS - MUSE: 58 DEGREES
T4 FREE SERPL-MCNC: 0.77 NG/DL (ref 0.76–1.46)
TSH SERPL DL<=0.005 MIU/L-ACNC: 0.29 MU/L (ref 0.4–4)
UROBILINOGEN UR STRIP-MCNC: NORMAL MG/DL
VENTRICULAR RATE- MUSE: 108 BPM
WBC # BLD AUTO: 4.6 10E3/UL (ref 4–11)

## 2022-11-16 PROCEDURE — 93010 ELECTROCARDIOGRAM REPORT: CPT | Performed by: EMERGENCY MEDICINE

## 2022-11-16 PROCEDURE — 36415 COLL VENOUS BLD VENIPUNCTURE: CPT | Performed by: STUDENT IN AN ORGANIZED HEALTH CARE EDUCATION/TRAINING PROGRAM

## 2022-11-16 PROCEDURE — 99285 EMERGENCY DEPT VISIT HI MDM: CPT | Mod: CS | Performed by: EMERGENCY MEDICINE

## 2022-11-16 PROCEDURE — 85004 AUTOMATED DIFF WBC COUNT: CPT | Performed by: STUDENT IN AN ORGANIZED HEALTH CARE EDUCATION/TRAINING PROGRAM

## 2022-11-16 PROCEDURE — 82330 ASSAY OF CALCIUM: CPT

## 2022-11-16 PROCEDURE — 80307 DRUG TEST PRSMV CHEM ANLYZR: CPT | Performed by: EMERGENCY MEDICINE

## 2022-11-16 PROCEDURE — 84439 ASSAY OF FREE THYROXINE: CPT | Performed by: STUDENT IN AN ORGANIZED HEALTH CARE EDUCATION/TRAINING PROGRAM

## 2022-11-16 PROCEDURE — 81025 URINE PREGNANCY TEST: CPT | Performed by: EMERGENCY MEDICINE

## 2022-11-16 PROCEDURE — 82947 ASSAY GLUCOSE BLOOD QUANT: CPT

## 2022-11-16 PROCEDURE — C9803 HOPD COVID-19 SPEC COLLECT: HCPCS | Performed by: EMERGENCY MEDICINE

## 2022-11-16 PROCEDURE — 258N000003 HC RX IP 258 OP 636: Performed by: STUDENT IN AN ORGANIZED HEALTH CARE EDUCATION/TRAINING PROGRAM

## 2022-11-16 PROCEDURE — 84443 ASSAY THYROID STIM HORMONE: CPT | Performed by: STUDENT IN AN ORGANIZED HEALTH CARE EDUCATION/TRAINING PROGRAM

## 2022-11-16 PROCEDURE — 82075 ASSAY OF BREATH ETHANOL: CPT | Performed by: EMERGENCY MEDICINE

## 2022-11-16 PROCEDURE — 81003 URINALYSIS AUTO W/O SCOPE: CPT | Mod: XU | Performed by: EMERGENCY MEDICINE

## 2022-11-16 PROCEDURE — U0003 INFECTIOUS AGENT DETECTION BY NUCLEIC ACID (DNA OR RNA); SEVERE ACUTE RESPIRATORY SYNDROME CORONAVIRUS 2 (SARS-COV-2) (CORONAVIRUS DISEASE [COVID-19]), AMPLIFIED PROBE TECHNIQUE, MAKING USE OF HIGH THROUGHPUT TECHNOLOGIES AS DESCRIBED BY CMS-2020-01-R: HCPCS | Performed by: EMERGENCY MEDICINE

## 2022-11-16 PROCEDURE — 93005 ELECTROCARDIOGRAM TRACING: CPT | Performed by: EMERGENCY MEDICINE

## 2022-11-16 PROCEDURE — 99284 EMERGENCY DEPT VISIT MOD MDM: CPT | Mod: CS,25 | Performed by: EMERGENCY MEDICINE

## 2022-11-16 PROCEDURE — 96360 HYDRATION IV INFUSION INIT: CPT | Performed by: EMERGENCY MEDICINE

## 2022-11-16 RX ORDER — NICOTINE 21 MG/24HR
1 PATCH, TRANSDERMAL 24 HOURS TRANSDERMAL ONCE
Status: DISCONTINUED | OUTPATIENT
Start: 2022-11-16 | End: 2022-11-16 | Stop reason: HOSPADM

## 2022-11-16 RX ORDER — SODIUM CHLORIDE 9 MG/ML
INJECTION, SOLUTION INTRAVENOUS CONTINUOUS
Status: DISCONTINUED | OUTPATIENT
Start: 2022-11-16 | End: 2022-11-16 | Stop reason: HOSPADM

## 2022-11-16 RX ORDER — BUPRENORPHINE 8 MG/1
8 TABLET SUBLINGUAL 2 TIMES DAILY
Qty: 14 TABLET | Refills: 0 | Status: SHIPPED | OUTPATIENT
Start: 2022-11-16

## 2022-11-16 RX ADMIN — SODIUM CHLORIDE 1000 ML: 9 INJECTION, SOLUTION INTRAVENOUS at 18:52

## 2022-11-16 ASSESSMENT — ENCOUNTER SYMPTOMS
UNEXPECTED WEIGHT CHANGE: 1
NAUSEA: 0
DIARRHEA: 0
DIZZINESS: 0
CONSTIPATION: 0
SHORTNESS OF BREATH: 0
TREMORS: 0
NERVOUS/ANXIOUS: 1
PALPITATIONS: 1
NUMBNESS: 0
HEADACHES: 0
BACK PAIN: 0
VOMITING: 0
ABDOMINAL PAIN: 0
RHINORRHEA: 1

## 2022-11-16 ASSESSMENT — ACTIVITIES OF DAILY LIVING (ADL): ADLS_ACUITY_SCORE: 35

## 2022-11-16 NOTE — ED TRIAGE NOTES
Triage Assessment     Row Name 11/16/22 [id number]       Triage Assessment (Adult)    Airway WDL WDL       Respiratory WDL    Respiratory WDL WDL       Skin Circulation/Temperature WDL    Skin Circulation/Temperature WDL WDL       Cardiac WDL    Cardiac WDL WDL

## 2022-11-17 ENCOUNTER — PATIENT OUTREACH (OUTPATIENT)
Dept: CARE COORDINATION | Facility: CLINIC | Age: 37
End: 2022-11-17

## 2022-11-17 NOTE — DISCHARGE INSTRUCTIONS
If withdrawal symptoms for opioid dependence worsen, please return to the ER. If craving symptoms worsen, Suboxone medication can be used and will be prescribed at the time of discharge. If more safety is needed, we recommend walking into the [facility]. Although CD treatment was meant to begin on 11/18, due to COVID status and quarantine requirements, patient should be cleared to attend group treatment environments by 11/22/2022.

## 2022-11-17 NOTE — PROGRESS NOTES
Clinic Care Coordination Contact    Referral Type: Virtual Home Monitoring - [facility] Program    Patient referred for Virtual Home Monitoring Program for either COVID-19 or Community Acquired Pneumonia diagnosis following recent [facility] ED visit.  [facility] referral is in place.    Criteria for Virtual Home Monitoring telephone outreach is not met after review of ED encounter/ED provider note because:    1) ED provider note indicates assessment was negative for respiratory distress. O2 sats were stable throughout course of ED visit per chart review.      2) Patient was not discharged with new supplemental oxygen.    Per notes, ED provider and/or ED care team discussed appropriate follow up guidelines with patient prior to discharge or reflected these instructions on AVS.       [facility] team remains available to support patient via [employer] account once activated by patient.     Joanne Cobian RN  [facility]  - RN Care Coordinator

## 2022-11-17 NOTE — ED PROVIDER NOTES
"ED Provider Note  [facility]      History     Chief Complaint   Patient presents with     Addiction Problem     Seeking detox from opioids and alcohol; last drink was around 1130 am today; drinks 1 shot daily or every other day; started drinking to get off opioids; usually smokes heroin but has used IV in the past; uses fentanyl, unknown amount and cuts it with \"something\"; fentanyl last used Saturday     HPI  Lala Calvin is a 37 year old female with history of MDD, EBENEZER, ARNULFO (meth, opiates, alcohol) who presents to the ED following concerns from her family of her recent 30-40 pound weight loss, secondary to substance use and recent housing instability. She reports that she is meant to start inpatient CD treatment this Friday at [facility]. Her last use of opioids was on Saturday night, 4 nights ago, and reports currently drinking one shot of alcohol every other day. She reports using alcohol to try to reduce her opiate use. She shares unresolved grief over the death of her boyfriend last summer, who she witnessed overdosed in their apartment.  She denies experiencing tremors, gait difficulty, feeling cold, nausea, vomiting, diarrhea and constipation. She endorses feeling anxious and dehydrated, experiencing chest palpitations, increased heart rate, and a slightly runny nose. She mentioned that over the summer, she was diagnosed with a 'heart problem' related to her increased heart rate and that her doctors at that time were thinking it was due to her drug use. Mentioned that she regularly uses nicotine and was angsty to leave to smoke.     Review of Systems   Constitutional: Positive for unexpected weight change.   HENT: Positive for rhinorrhea.    Respiratory: Negative for shortness of breath.    Cardiovascular: Positive for palpitations. Negative for chest pain.   Gastrointestinal: Negative for abdominal pain, constipation, diarrhea, nausea and vomiting.   Musculoskeletal: " "Negative for back pain.   Neurological: Negative for dizziness, tremors, numbness and headaches.   Psychiatric/Behavioral: The patient is nervous/anxious.      A complete review of systems was performed with pertinent positives and negatives noted in the HPI, and all other systems negative.    Physical Exam   BP: 118/54  Pulse: 118  Temp: 98  F (36.7  C)  Resp: 18  Height: 171.5 cm (5' 7.5\")  Weight: 54.2 kg (119 lb 6.4 oz)  SpO2: 97 %  Physical Exam  HENT:      Nose: Rhinorrhea present.   Eyes:      Extraocular Movements: Extraocular movements intact.      Pupils: Pupils are equal, round, and reactive to light.   Cardiovascular:      Rate and Rhythm: Regular rhythm. Tachycardia present.      Pulses: Normal pulses.      Heart sounds: Normal heart sounds.   Pulmonary:      Effort: Pulmonary effort is normal.      Breath sounds: Normal breath sounds.   Abdominal:      General: Bowel sounds are normal. There is no distension.      Palpations: Abdomen is soft. There is no mass.      Tenderness: There is no abdominal tenderness. There is no guarding.   Skin:     General: Skin is warm.   Neurological:      General: No focal deficit present.      Mental Status: She is alert and oriented to person, place, and time.      Motor: No weakness.      Gait: Gait normal.       ED Course      Procedures      None performed      Results for orders placed or performed during the hospital encounter of 11/16/22   Alcohol breath test POCT     Status: Abnormal   Result Value Ref Range    Alcohol Breath Test 0.111 (A) 0.00 - 0.01   Urine Drugs of Abuse Screen     Status: None ()    Narrative    The following orders were created for panel order Urine Drugs of Abuse Screen.  Procedure                               Abnormality         Status                     ---------                               -----------         ------                     Drug abuse screen 1 urin...[[id number]]                                                   Please " view results for these tests on the individual orders.     Medications   0.9% sodium chloride BOLUS (has no administration in time range)     Followed by   sodium chloride 0.9% infusion (has no administration in time range)        Assessments & Plan (with Medical Decision Making)   Lala Calvin is a 37 year old female with history of MDD, EBENEZER, ARNULFO (meth, opiates, alcohol) who presents to the ED following concerns from her family of her recent 30-40 pound weight loss, secondary to substance use and recent housing instability. Given her history of accidental overdose a month ago, and her recent opiate use over the weekend, the most likely differential at this time is opiate withdrawal. Her COWS score is 6 which makes her eligible for supportive care and suboxone. Labs ordered: CBC, electrolytes, TSH to evaluate for nutritional deficiencies. A L of fluids is also given due to her complaints of dehydration. Due to recent new history of a heart rate rhythm abnormality, EKG will be checked today as well. Given frequent nicotine use and some agitation relating to cravings, a nicotine patch is provided. Given low presentation of withdrawal symptoms and COW score of 5,  Suboxone 8mg/2mg BID is not administered at this time but will be ordered for discharge for the patient.     I have reviewed the nursing notes. I have reviewed the findings, diagnosis, plan and need for follow up with the patient.    New Prescriptions    No medications on file       Final diagnoses:   Infection due to 2019 novel coronavirus   Opioid withdrawal (H)       Brian Larios  PGY-1, Psychiatry     --    ED Attending Physician Attestation    CALVIN Kaye MD, cared for this patient with the Resident. I have performed a history and physical examination of the patient and discussed management with the resident. I reviewed the resident's documentation above and agree with the documented findings and plan of care.    Summary of HPI, PE, ED Course    Patient is a 37 year old female evaluated in the emergency department for URI symptoms and opioid withdrawal. Exam notable for NAD. ED course notable for positive COVID swab. After the completion of care in the emergency department, the patient was discharged.    Critical Care & Procedures  Not applicable.    Medical Decision Making  The medical record was reviewed and interpreted.  Current labs reviewed and interpreted.  Previous labs reviewed and interpreted.      Alma Kaye MD  Emergency Medicine     --  Alma Kaye MD  [facility] EMERGENCY DEPARTMENT  11/16/2022     Alma Kaye MD  11/22/22 [id number]

## 2022-11-17 NOTE — ED NOTES
Pt states starting to feel better since getting the PO meds. Pt given another cup of juice. Reported off on Pt to Angelique ALMEIDA taking over Pt care.

## 2022-12-11 ENCOUNTER — E-VISIT (OUTPATIENT)
Dept: URGENT CARE | Facility: CLINIC | Age: 37
End: 2022-12-11
Payer: COMMERCIAL

## 2022-12-11 DIAGNOSIS — M79.89 LEG SWELLING: Primary | ICD-10-CM

## 2022-12-11 PROCEDURE — 99207 PR NON-BILLABLE SERV PER CHARTING: CPT | Performed by: PHYSICIAN ASSISTANT

## 2022-12-11 NOTE — PATIENT INSTRUCTIONS
Dear Lala Calvin?     After reviewing your responses, I am unable to make a diagnosis that can be treated online.    You will not be charged for this eVisit.     We are dedicated to helping you achieve your best health and would like to see you in one of our many clinic locations - a primary care provider would be ideal for your concern.    Please use [email or web address] to schedule a visit with a provider or call [phone] ([phone]) to schedule at any of our locations.    Thanks for choosing?us?as your health care partner,?   ?   Yovanny Antoine PA-C?

## 2022-12-12 ENCOUNTER — DOCUMENTATION ONLY (OUTPATIENT)
Dept: LAB | Facility: CLINIC | Age: 37
End: 2022-12-12

## 2022-12-12 NOTE — PROGRESS NOTES
Pt has upcoming lab appt scheduled.     Appt notes: My TH level    PT has appt with provider same day.     Please place orders or contact pt for rescheduling.     Thank you.

## 2022-12-13 ENCOUNTER — OFFICE VISIT (OUTPATIENT)
Dept: FAMILY MEDICINE | Facility: CLINIC | Age: 37
End: 2022-12-13
Payer: COMMERCIAL

## 2022-12-13 VITALS
DIASTOLIC BLOOD PRESSURE: 86 MMHG | TEMPERATURE: 98 F | HEIGHT: 68 IN | HEART RATE: 113 BPM | SYSTOLIC BLOOD PRESSURE: 128 MMHG | OXYGEN SATURATION: 93 % | RESPIRATION RATE: 19 BRPM | BODY MASS INDEX: 18.11 KG/M2 | WEIGHT: 119.5 LBS

## 2022-12-13 DIAGNOSIS — R79.89 LOW TSH LEVEL: Primary | ICD-10-CM

## 2022-12-13 DIAGNOSIS — R60.0 PERIPHERAL EDEMA: ICD-10-CM

## 2022-12-13 PROBLEM — F43.12 CHRONIC POST-TRAUMATIC STRESS DISORDER (PTSD): Status: ACTIVE | Noted: 2020-04-22

## 2022-12-13 PROBLEM — F19.20 CHEMICAL DEPENDENCY (H): Status: ACTIVE | Noted: 2021-12-21

## 2022-12-13 PROBLEM — F50.029 ANOREXIA NERVOSA, BINGE-EATING PURGING TYPE: Status: ACTIVE | Noted: 2022-12-13

## 2022-12-13 PROBLEM — T50.901A DRUG OVERDOSE, ACCIDENTAL OR UNINTENTIONAL, INITIAL ENCOUNTER: Status: ACTIVE | Noted: 2022-10-14

## 2022-12-13 PROBLEM — F15.10 AMPHETAMINE ABUSE (H): Status: ACTIVE | Noted: 2020-09-25

## 2022-12-13 PROBLEM — F90.9 ATTENTION DEFICIT HYPERACTIVITY DISORDER (ADHD): Status: ACTIVE | Noted: 2020-04-22

## 2022-12-13 PROBLEM — F41.8 DEPRESSION WITH ANXIETY: Status: ACTIVE | Noted: 2021-12-21

## 2022-12-13 PROCEDURE — 84439 ASSAY OF FREE THYROXINE: CPT | Performed by: PHYSICIAN ASSISTANT

## 2022-12-13 PROCEDURE — 84443 ASSAY THYROID STIM HORMONE: CPT | Performed by: PHYSICIAN ASSISTANT

## 2022-12-13 PROCEDURE — 84481 FREE ASSAY (FT-3): CPT | Performed by: PHYSICIAN ASSISTANT

## 2022-12-13 PROCEDURE — 99213 OFFICE O/P EST LOW 20 MIN: CPT | Performed by: PHYSICIAN ASSISTANT

## 2022-12-13 PROCEDURE — 36415 COLL VENOUS BLD VENIPUNCTURE: CPT | Performed by: PHYSICIAN ASSISTANT

## 2022-12-13 PROCEDURE — 80053 COMPREHEN METABOLIC PANEL: CPT | Performed by: PHYSICIAN ASSISTANT

## 2022-12-13 ASSESSMENT — PAIN SCALES - GENERAL: PAINLEVEL: NO PAIN (0)

## 2022-12-13 NOTE — PROGRESS NOTES
Assessment & Plan     Low TSH level  Check thyroid levels today.  - TSH  - T4, free  - T3, Free  - TSH  - T4, free  - T3, Free    Peripheral edema  Given persistence of symptoms off and on for a number of years discussed the patient multiple different diagnoses that are potential.  Including peripheral vascular, cardiovascular, renal/liver.  Wishes to just do blood work at this time.  No further work-up.  Recommended compression stockings and leg elevation.  She wonders if this is related to recent medication changes.  Discussed signs that warrant symptoms that warrant urgent/emergent reevaluation.  Recommended follow-up if no improvement.    - Comprehensive metabolic panel (BMP + Alb, Alk Phos, ALT, AST, Total. Bili, TP)  - Comprehensive metabolic panel (BMP + Alb, Alk Phos, ALT, AST, Total. Bili, TP)       No follow-ups on file.    The likelihood of other entities in the differential is insufficient to justify any further testing for them at this time. This was explained to the patient. The patient was advised that persistent or worsening symptoms would require further evaluation. Patient advised to call the office and if unable to reach to go to the emergency room if they develop any new or worsening symptoms. Expressed understanding and agreement with above stated plan.     ANGELINE Reyna [facility] VIC Reeves is a 37 year old, presenting for the following health issues:  Cellulitis (Left foot, summer time), Labs Only (Requested if needed), and Gyn Exam (Aware and has not had one in 3 years)    Bilateral lower extremity present off and on over the past few years. Ankles and feet. Worse following the summer. Made better by leg elevation. She is concerned for cellulitis. No redness or itching but on occasion will cause discomfort. No tenderness to palp.  Will note that her shoes will not fit on occasion.    Ongoing evaluation with psychiatry for history of chemical  "dependence, alcohol use disorder (currently sober), mixed bipolar disorder, amphetamine abuse.  Has been ingesting a lot of her medications.  Recently started seeing a new psychiatrist.    Due for thyroid follow-up.  Low TSH at last check.    Review of Systems   Constitutional, HEENT, cardiovascular, pulmonary, GI, , musculoskeletal, neuro, skin, endocrine and psych systems are negative, except as otherwise noted.      Objective    /86 (BP Location: Right arm, Patient Position: Sitting, Cuff Size: Adult Regular)   Pulse 113   Temp 98  F (36.7  C) (Oral)   Resp 19   Ht 1.715 m (5' 7.5\")   Wt 54.2 kg (119 lb 8 oz)   SpO2 93%   BMI 18.44 kg/m    Body mass index is 18.44 kg/m .     Allergies   Allergen Reactions     No Known Drug Allergies      Ragweeds      Other reaction(s): *Unknown     Seasonal Allergies      Current Outpatient Medications   Medication Sig Dispense Refill     buprenorphine (SUBUTEX) 8 MG SUBL sublingual tablet Place 1 tablet (8 mg) under the tongue 2 times daily 14 tablet 0     buprenorphine HCl-naloxone HCl (SUBOXONE) 8-2 MG per film Place 1 Film under the tongue daily Dissolve 1 film under the tongue everyday at noon and dissolve 1/film under the tongue every night at bedtime       escitalopram (LEXAPRO) 20 MG tablet Take 20 mg by mouth daily       ferrous sulfate (FEROSUL) 325 (65 Fe) MG tablet Take 1 tablet (325 mg) by mouth daily (with breakfast) Recommend to take with Vitamin C (orange juice) for better absorption. 30 tablet 2     ibuprofen (ADVIL/MOTRIN) 200 MG capsule Take 1-3 capsules (200-600 mg) by mouth every 4 hours as needed for mild pain, moderate pain or inflammatory pain 90 capsule 3     melatonin 3 MG tablet Take 1-2 tablets (3-6 mg) by mouth nightly as needed for sleep 90 tablet 3     multivitamin, therapeutic (MULTIVITAMIN) TABS tablet Take 1 tablet by mouth daily 90 tablet 3     naloxone (NARCAN) 4 MG/0.1ML nasal spray Inhale 1 Spray into affected nostril(s) each " time if needed for Patient Diff To Arouse or Resp Rate < 8 / min. Additional doses may be given every 2 to 3 minutes until emergency medical assistance arrives.       polyethylene glycol 3350 POWD daily as needed Mix 34 grams with 8 ounces of fluid and drink daily as needed       senna (SENOKOT) 8.6 MG tablet Take 1 tablet by mouth 2 times daily as needed for constipation       gabapentin (NEURONTIN) 300 MG capsule Take 300 mg by mouth 2 times daily Take 1 capsule by mouth twice a day (4pm and every night at bedtime) (Patient not taking: Reported on 12/13/2022)       nicotine (NICODERM CQ) 14 MG/24HR 24 hr patch Place 1 patch onto the skin every 24 hours (Patient not taking: Reported on 12/13/2022) 14 patch 0     traZODone (DESYREL) 50 MG tablet Take 0.5-1 tablets (25-50 mg) by mouth nightly as needed for sleep Take 1-2 tablets by mouth every night at bedtime as needed (Patient not taking: Reported on 12/13/2022) 30 tablet 0     Past Medical History:   Diagnosis Date     ADHD (attention deficit hyperactivity disorder)      Anorexia nervosa, binge-eating purging type      Anxiety      Depressive disorder      PCOS (polycystic ovarian syndrome)      Substance abuse (H)      Past Surgical History:   Procedure Laterality Date     WIS[place] TOOTH EXTRACTION         Physical Exam   GENERAL: healthy, alert and no distress  EYES: Eyes grossly normal to inspection, PERRL and conjunctivae and sclerae normal  HENT: ear canals and TM's normal, nose and mouth without ulcers or lesions  NECK: no adenopathy, no asymmetry, masses, or scars and thyroid normal to palpation  RESP: lungs clear to auscultation - no rales, rhonchi or wheezes  CV: regular rate and rhythm, normal S1 S2, no S3 or S4, no murmur, click or rub, nonpitting edema from bilateral ankles to her feet, peripheral pulses strong.  No breaks in the skin.  No redness.  Sensation intact.  ABDOMEN: soft, nontender, no hepatosplenomegaly, no masses and bowel sounds normal  MS:  no gross musculoskeletal defects noted, no edema  SKIN: no suspicious lesions or rashes  NEURO: Normal strength and tone, mentation intact and speech normal  PSYCH: mentation appears normal, affect normal/bright

## 2022-12-14 LAB
ALBUMIN SERPL BCG-MCNC: 4.3 G/DL (ref 3.5–5.2)
ALP SERPL-CCNC: 65 U/L (ref 35–104)
ALT SERPL W P-5'-P-CCNC: 23 U/L (ref 10–35)
ANION GAP SERPL CALCULATED.3IONS-SCNC: 8 MMOL/L (ref 7–15)
AST SERPL W P-5'-P-CCNC: 28 U/L (ref 10–35)
BILIRUB SERPL-MCNC: 0.6 MG/DL
BUN SERPL-MCNC: 13 MG/DL (ref 6–20)
CALCIUM SERPL-MCNC: 8.9 MG/DL (ref 8.6–10)
CHLORIDE SERPL-SCNC: 102 MMOL/L (ref 98–107)
CREAT SERPL-MCNC: 0.87 MG/DL (ref 0.51–0.95)
DEPRECATED HCO3 PLAS-SCNC: 29 MMOL/L (ref 22–29)
GFR SERPL CREATININE-BSD FRML MDRD: 88 ML/MIN/1.73M2
GLUCOSE SERPL-MCNC: 88 MG/DL (ref 70–99)
POTASSIUM SERPL-SCNC: 4 MMOL/L (ref 3.4–5.3)
PROT SERPL-MCNC: 7.3 G/DL (ref 6.4–8.3)
SODIUM SERPL-SCNC: 139 MMOL/L (ref 136–145)
T3FREE SERPL-MCNC: 2.9 PG/ML (ref 2–4.4)
T4 FREE SERPL-MCNC: 0.9 NG/DL (ref 0.9–1.7)
TSH SERPL DL<=0.005 MIU/L-ACNC: 1.45 UIU/ML (ref 0.3–4.2)

## 2022-12-14 NOTE — RESULT ENCOUNTER NOTE
Vikram Reeves,     Thyroid function stable.  Electrolytes, kidney function, and liver function all stable as well.    Let me know if you have any questions or concerns,     Alphonse Fernandes PA-C  [facility]

## 2023-04-15 PROCEDURE — 99284 EMERGENCY DEPT VISIT MOD MDM: CPT | Mod: 25

## 2023-04-16 ENCOUNTER — HOSPITAL ENCOUNTER (EMERGENCY)
Facility: CLINIC | Age: 38
Discharge: HOME | End: 2023-04-16
Attending: EMERGENCY MEDICINE | Admitting: EMERGENCY MEDICINE
Payer: COMMERCIAL

## 2023-04-16 ENCOUNTER — HOSPITAL ENCOUNTER (EMERGENCY)
Facility: CLINIC | Age: 38
Discharge: HOME | End: 2023-04-17
Attending: EMERGENCY MEDICINE | Admitting: EMERGENCY MEDICINE
Payer: COMMERCIAL

## 2023-04-16 ENCOUNTER — APPOINTMENT (OUTPATIENT)
Dept: GENERAL RADIOLOGY | Facility: CLINIC | Age: 38
End: 2023-04-16
Attending: EMERGENCY MEDICINE
Payer: COMMERCIAL

## 2023-04-16 ENCOUNTER — APPOINTMENT (OUTPATIENT)
Dept: CT IMAGING | Facility: CLINIC | Age: 38
End: 2023-04-16
Attending: EMERGENCY MEDICINE
Payer: COMMERCIAL

## 2023-04-16 ENCOUNTER — NURSE TRIAGE (OUTPATIENT)
Dept: NURSING | Facility: CLINIC | Age: 38
End: 2023-04-16

## 2023-04-16 VITALS
HEART RATE: 93 BPM | DIASTOLIC BLOOD PRESSURE: 84 MMHG | RESPIRATION RATE: 20 BRPM | TEMPERATURE: 98.1 F | WEIGHT: 135 LBS | SYSTOLIC BLOOD PRESSURE: 123 MMHG | BODY MASS INDEX: 20.83 KG/M2 | OXYGEN SATURATION: 100 %

## 2023-04-16 DIAGNOSIS — R33.9 URINARY RETENTION WITH INCOMPLETE BLADDER EMPTYING: ICD-10-CM

## 2023-04-16 DIAGNOSIS — F11.10 OPIOID ABUSE (H): ICD-10-CM

## 2023-04-16 DIAGNOSIS — F11.90 OPIOID USE: ICD-10-CM

## 2023-04-16 DIAGNOSIS — K59.03 DRUG-INDUCED CONSTIPATION: ICD-10-CM

## 2023-04-16 DIAGNOSIS — K59.00 OBSTIPATION: ICD-10-CM

## 2023-04-16 LAB
ALBUMIN SERPL BCG-MCNC: 4.1 G/DL (ref 3.5–5.2)
ALBUMIN SERPL BCG-MCNC: 4.2 G/DL (ref 3.5–5.2)
ALBUMIN UR-MCNC: NEGATIVE MG/DL
ALP SERPL-CCNC: 61 U/L (ref 35–104)
ALP SERPL-CCNC: 62 U/L (ref 35–104)
ALT SERPL W P-5'-P-CCNC: 27 U/L (ref 10–35)
ALT SERPL W P-5'-P-CCNC: 30 U/L (ref 10–35)
AMPHETAMINES UR QL SCN: ABNORMAL
ANION GAP SERPL CALCULATED.3IONS-SCNC: 11 MMOL/L (ref 7–15)
ANION GAP SERPL CALCULATED.3IONS-SCNC: 11 MMOL/L (ref 7–15)
APPEARANCE UR: CLEAR
AST SERPL W P-5'-P-CCNC: 31 U/L (ref 10–35)
AST SERPL W P-5'-P-CCNC: 39 U/L (ref 10–35)
BARBITURATES UR QL SCN: ABNORMAL
BASOPHILS # BLD AUTO: 0 10E3/UL (ref 0–0.2)
BASOPHILS # BLD AUTO: 0 10E3/UL (ref 0–0.2)
BASOPHILS NFR BLD AUTO: 0 %
BASOPHILS NFR BLD AUTO: 0 %
BENZODIAZ UR QL SCN: ABNORMAL
BILIRUB SERPL-MCNC: 0.3 MG/DL
BILIRUB SERPL-MCNC: 0.7 MG/DL
BILIRUB UR QL STRIP: NEGATIVE
BUN SERPL-MCNC: 12.6 MG/DL (ref 6–20)
BUN SERPL-MCNC: 15 MG/DL (ref 6–20)
BZE UR QL SCN: ABNORMAL
CALCIUM SERPL-MCNC: 9.3 MG/DL (ref 8.6–10)
CALCIUM SERPL-MCNC: 9.4 MG/DL (ref 8.6–10)
CANNABINOIDS UR QL SCN: ABNORMAL
CHLORIDE SERPL-SCNC: 101 MMOL/L (ref 98–107)
CHLORIDE SERPL-SCNC: 103 MMOL/L (ref 98–107)
COLOR UR AUTO: ABNORMAL
CREAT SERPL-MCNC: 0.61 MG/DL (ref 0.51–0.95)
CREAT SERPL-MCNC: 0.68 MG/DL (ref 0.51–0.95)
DEPRECATED HCO3 PLAS-SCNC: 25 MMOL/L (ref 22–29)
DEPRECATED HCO3 PLAS-SCNC: 25 MMOL/L (ref 22–29)
EOSINOPHIL # BLD AUTO: 0 10E3/UL (ref 0–0.7)
EOSINOPHIL # BLD AUTO: 0.1 10E3/UL (ref 0–0.7)
EOSINOPHIL NFR BLD AUTO: 0 %
EOSINOPHIL NFR BLD AUTO: 1 %
ERYTHROCYTE [DISTWIDTH] IN BLOOD BY AUTOMATED COUNT: 12.3 % (ref 10–15)
ERYTHROCYTE [DISTWIDTH] IN BLOOD BY AUTOMATED COUNT: 13 % (ref 10–15)
GFR SERPL CREATININE-BSD FRML MDRD: >90 ML/MIN/1.73M2
GFR SERPL CREATININE-BSD FRML MDRD: >90 ML/MIN/1.73M2
GLUCOSE SERPL-MCNC: 112 MG/DL (ref 70–99)
GLUCOSE SERPL-MCNC: 152 MG/DL (ref 70–99)
GLUCOSE UR STRIP-MCNC: NEGATIVE MG/DL
HCG SER QL IA.RAPID: NEGATIVE
HCG UR QL: NEGATIVE
HCT VFR BLD AUTO: 31.9 % (ref 35–47)
HCT VFR BLD AUTO: 35 % (ref 35–47)
HGB BLD-MCNC: 10.6 G/DL (ref 11.7–15.7)
HGB BLD-MCNC: 11.9 G/DL (ref 11.7–15.7)
HGB UR QL STRIP: NEGATIVE
IMM GRANULOCYTES # BLD: 0 10E3/UL
IMM GRANULOCYTES # BLD: 0 10E3/UL
IMM GRANULOCYTES NFR BLD: 0 %
IMM GRANULOCYTES NFR BLD: 0 %
KETONES UR STRIP-MCNC: NEGATIVE MG/DL
LEUKOCYTE ESTERASE UR QL STRIP: NEGATIVE
LIPASE SERPL-CCNC: 45 U/L (ref 13–60)
LYMPHOCYTES # BLD AUTO: 1 10E3/UL (ref 0.8–5.3)
LYMPHOCYTES # BLD AUTO: 1.4 10E3/UL (ref 0.8–5.3)
LYMPHOCYTES NFR BLD AUTO: 12 %
LYMPHOCYTES NFR BLD AUTO: 15 %
MCH RBC QN AUTO: 27.5 PG (ref 26.5–33)
MCH RBC QN AUTO: 27.7 PG (ref 26.5–33)
MCHC RBC AUTO-ENTMCNC: 33.2 G/DL (ref 31.5–36.5)
MCHC RBC AUTO-ENTMCNC: 34 G/DL (ref 31.5–36.5)
MCV RBC AUTO: 81 FL (ref 78–100)
MCV RBC AUTO: 83 FL (ref 78–100)
MONOCYTES # BLD AUTO: 0.6 10E3/UL (ref 0–1.3)
MONOCYTES # BLD AUTO: 1 10E3/UL (ref 0–1.3)
MONOCYTES NFR BLD AUTO: 11 %
MONOCYTES NFR BLD AUTO: 7 %
MUCOUS THREADS #/AREA URNS LPF: PRESENT /LPF
NEUTROPHILS # BLD AUTO: 6.6 10E3/UL (ref 1.6–8.3)
NEUTROPHILS # BLD AUTO: 6.7 10E3/UL (ref 1.6–8.3)
NEUTROPHILS NFR BLD AUTO: 73 %
NEUTROPHILS NFR BLD AUTO: 81 %
NITRATE UR QL: NEGATIVE
NRBC # BLD AUTO: 0 10E3/UL
NRBC # BLD AUTO: 0 10E3/UL
NRBC BLD AUTO-RTO: 0 /100
NRBC BLD AUTO-RTO: 0 /100
OPIATES UR QL SCN: ABNORMAL
PH UR STRIP: 7 [PH] (ref 5–7)
PLATELET # BLD AUTO: 230 10E3/UL (ref 150–450)
PLATELET # BLD AUTO: 301 10E3/UL (ref 150–450)
POTASSIUM SERPL-SCNC: 3.5 MMOL/L (ref 3.4–5.3)
POTASSIUM SERPL-SCNC: 3.6 MMOL/L (ref 3.4–5.3)
PROT SERPL-MCNC: 7.3 G/DL (ref 6.4–8.3)
PROT SERPL-MCNC: 7.4 G/DL (ref 6.4–8.3)
RBC # BLD AUTO: 3.83 10E6/UL (ref 3.8–5.2)
RBC # BLD AUTO: 4.33 10E6/UL (ref 3.8–5.2)
RBC URINE: 0 /HPF
SODIUM SERPL-SCNC: 137 MMOL/L (ref 136–145)
SODIUM SERPL-SCNC: 139 MMOL/L (ref 136–145)
SP GR UR STRIP: 1.01 (ref 1–1.03)
UROBILINOGEN UR STRIP-MCNC: NORMAL MG/DL
WBC # BLD AUTO: 8.2 10E3/UL (ref 4–11)
WBC # BLD AUTO: 9.2 10E3/UL (ref 4–11)
WBC URINE: <1 /HPF

## 2023-04-16 PROCEDURE — 36415 COLL VENOUS BLD VENIPUNCTURE: CPT | Performed by: EMERGENCY MEDICINE

## 2023-04-16 PROCEDURE — 81025 URINE PREGNANCY TEST: CPT | Performed by: EMERGENCY MEDICINE

## 2023-04-16 PROCEDURE — 250N000013 HC RX MED GY IP 250 OP 250 PS 637: Performed by: EMERGENCY MEDICINE

## 2023-04-16 PROCEDURE — 258N000003 HC RX IP 258 OP 636: Performed by: EMERGENCY MEDICINE

## 2023-04-16 PROCEDURE — 96361 HYDRATE IV INFUSION ADD-ON: CPT

## 2023-04-16 PROCEDURE — 51702 INSERT TEMP BLADDER CATH: CPT

## 2023-04-16 PROCEDURE — 74019 RADEX ABDOMEN 2 VIEWS: CPT

## 2023-04-16 PROCEDURE — 81001 URINALYSIS AUTO W/SCOPE: CPT | Performed by: EMERGENCY MEDICINE

## 2023-04-16 PROCEDURE — 85025 COMPLETE CBC W/AUTO DIFF WBC: CPT | Performed by: EMERGENCY MEDICINE

## 2023-04-16 PROCEDURE — 83690 ASSAY OF LIPASE: CPT | Performed by: EMERGENCY MEDICINE

## 2023-04-16 PROCEDURE — 84703 CHORIONIC GONADOTROPIN ASSAY: CPT

## 2023-04-16 PROCEDURE — 85004 AUTOMATED DIFF WBC COUNT: CPT | Performed by: EMERGENCY MEDICINE

## 2023-04-16 PROCEDURE — 99285 EMERGENCY DEPT VISIT HI MDM: CPT | Mod: 25

## 2023-04-16 PROCEDURE — 250N000011 HC RX IP 250 OP 636: Performed by: EMERGENCY MEDICINE

## 2023-04-16 PROCEDURE — 84155 ASSAY OF PROTEIN SERUM: CPT | Performed by: EMERGENCY MEDICINE

## 2023-04-16 PROCEDURE — 250N000009 HC RX 250: Performed by: EMERGENCY MEDICINE

## 2023-04-16 PROCEDURE — 80307 DRUG TEST PRSMV CHEM ANLYZR: CPT | Performed by: EMERGENCY MEDICINE

## 2023-04-16 PROCEDURE — 96374 THER/PROPH/DIAG INJ IV PUSH: CPT | Mod: 59

## 2023-04-16 PROCEDURE — 96360 HYDRATION IV INFUSION INIT: CPT

## 2023-04-16 PROCEDURE — 80053 COMPREHEN METABOLIC PANEL: CPT | Performed by: EMERGENCY MEDICINE

## 2023-04-16 PROCEDURE — 74177 CT ABD & PELVIS W/CONTRAST: CPT

## 2023-04-16 RX ORDER — SODIUM CHLORIDE 9 MG/ML
INJECTION, SOLUTION INTRAVENOUS CONTINUOUS
Status: DISCONTINUED | OUTPATIENT
Start: 2023-04-16 | End: 2023-04-16 | Stop reason: HOSPADM

## 2023-04-16 RX ORDER — LIDOCAINE HYDROCHLORIDE 20 MG/ML
10 JELLY TOPICAL ONCE
Status: COMPLETED | OUTPATIENT
Start: 2023-04-16 | End: 2023-04-16

## 2023-04-16 RX ORDER — IOPAMIDOL 755 MG/ML
500 INJECTION, SOLUTION INTRAVASCULAR ONCE
Status: COMPLETED | OUTPATIENT
Start: 2023-04-16 | End: 2023-04-16

## 2023-04-16 RX ORDER — SODIUM CHLORIDE 9 MG/ML
INJECTION, SOLUTION INTRAVENOUS CONTINUOUS
Status: DISCONTINUED | OUTPATIENT
Start: 2023-04-16 | End: 2023-04-17 | Stop reason: HOSPADM

## 2023-04-16 RX ORDER — ONDANSETRON 2 MG/ML
4 INJECTION INTRAMUSCULAR; INTRAVENOUS EVERY 30 MIN PRN
Status: DISCONTINUED | OUTPATIENT
Start: 2023-04-16 | End: 2023-04-17 | Stop reason: HOSPADM

## 2023-04-16 RX ADMIN — SODIUM CHLORIDE 1000 ML: 9 INJECTION, SOLUTION INTRAVENOUS at 00:57

## 2023-04-16 RX ADMIN — DOCUSATE SODIUM 226 ML: 50 LIQUID ORAL at 02:34

## 2023-04-16 RX ADMIN — IOPAMIDOL 68 ML: 755 INJECTION, SOLUTION INTRAVENOUS at 23:07

## 2023-04-16 RX ADMIN — LIDOCAINE HYDROCHLORIDE 10 ML: 20 JELLY TOPICAL at 02:33

## 2023-04-16 RX ADMIN — SODIUM CHLORIDE 1000 ML: 9 INJECTION, SOLUTION INTRAVENOUS at 22:22

## 2023-04-16 RX ADMIN — ONDANSETRON 4 MG: 2 INJECTION INTRAMUSCULAR; INTRAVENOUS at 22:21

## 2023-04-16 ASSESSMENT — ACTIVITIES OF DAILY LIVING (ADL)
ADLS_ACUITY_SCORE: 35
ADLS_ACUITY_SCORE: 33
ADLS_ACUITY_SCORE: 35
ADLS_ACUITY_SCORE: 35

## 2023-04-16 ASSESSMENT — ENCOUNTER SYMPTOMS
CHEST TIGHTNESS: 0
FEVER: 0
PALPITATIONS: 0
SHORTNESS OF BREATH: 0
DIARRHEA: 0
BACK PAIN: 1
DYSURIA: 1
CHILLS: 0
ABDOMINAL PAIN: 1
CONSTIPATION: 1
VOMITING: 0
NAUSEA: 1
APPETITE CHANGE: 1

## 2023-04-16 NOTE — ED NOTES
[facility] has a female bed available- they state they will not have a bed available until 11am. She requests that RN called at  for report and bed update. Bed is held for patient

## 2023-04-16 NOTE — ED PROVIDER NOTES
History     Chief Complaint:  Constipation    The history is provided by the patient and a friend.      Lala Calvin is a 37 year old female with a history of anorexia nervosa and substance abuse who presents with constipation, unable to pass stool since this morning. She reports associated rectal pain and intermittent nausea. She has not been eating today due to nausea. No known injury to the rectum of large stools passed recently. She notes she does not pass stool often due to her eating disorder. She eats very little and purges roughly once a week. Denies abuse of laxatives in the past. She is scheduled to start treatment for her eating disorder on Monday. Denies chance of pregnancy. Denies opioid use today.    Independent Historian: The patient's friend reports she took an over the counter laxative and Colace about 3 hours ago on the way to the emergency department. She also reports that when she first visited her friend, she was very weak. She noted possible dark stool on her bottom which the patient was unable to clean off prior to getting dressed.  The patient's friend also reports that she has stopped taking her Suboxone and is abusing fentanyl and is interested in detox.    Review of External Notes: None     ROS:  10 point review of systems performed and is negative except as above and in HPI.     Allergies:  No Known Drug Allergies  Ragweeds  Seasonal Allergies     Medications:    Subutex  Suboxone  Lexapro  Ferosul  Gabapentin   Narcan  Trazodone   Klonopin  Bupropion  Inderal LA    Past Medical History:    ADHD  Anorexia nervosa  Anxiety   Depression   PCOS  Substance abuse, alcohol, methamphetamine, and heroin  Mixed bipolar disorder    Gonorrhea    Past Surgical History:    [place] teeth extraction      Family History:    Mother- hypertension, anxiety   Sister- anxiety, depression     Social History:  The patient presents to the ED via private vehicle.  Patient presents with her  friend.    Physical Exam     Patient Vitals for the past 24 hrs:   BP Temp Temp src Pulse Resp SpO2 Weight   04/16/23 0646 -- -- -- -- -- 98 % --   04/16/23 0457 113/81 -- -- 103 -- -- --   04/16/23 0212 128/85 -- -- 110 20 97 % --   04/16/23 0135 -- -- -- -- -- 96 % --   04/15/23 2351 -- -- -- -- 16 99 % --   04/15/23 2350 104/67 98.1  F (36.7  C) Temporal 108 -- -- 61.2 kg (135 lb)        Physical Exam  General: Resting on the gurney, appears uncomfortable  Head:  The scalp, face, and head appear normal  Mouth/Throat: Mucus membranes are moist  CV:  Regular rate    Normal S1 and S2  No pathological murmur   Resp:  Breath sounds clear and equal bilaterally    Non-labored, no retractions or accessory muscle use    No coarseness    No wheezing   GI:  Abdomen is soft, no rigidity    No tenderness to palpation  MS:  Normal motor assessment of all extremities.    Good capillary refill noted.  Skin:  Large bruise to the right lateral thigh. No rash or lesions noted.  Neuro: Speech is occasional slurred. Per her friend here with her, this is not unusual.  Psych: Awake. Alert.  Normal affect.      Appropriate interactions.  Rectum: Dark brown stool on the outside of the anus.    Emergency Department Course   Imaging:  Abdomen XR, 2 vw, flat and upright   Final Result   IMPRESSION: Negative abdomen. Bowel gas pattern is normal. Large stool gas retention. Nothing for obstruction or free air. No evidence for renal stones.         Report per radiology    Laboratory:  Labs Ordered and Resulted from Time of ED Arrival to Time of ED Departure   COMPREHENSIVE METABOLIC PANEL - Abnormal       Result Value    Sodium 137      Potassium 3.5      Chloride 101      Carbon Dioxide (CO2) 25      Anion Gap 11      Urea Nitrogen 12.6      Creatinine 0.68      Calcium 9.4      Glucose 152 (*)     Alkaline Phosphatase 61      AST 39 (*)     ALT 30      Protein Total 7.3      Albumin 4.1      Bilirubin Total 0.7      GFR Estimate >90     ISTAT  HCG QUALITATIVE PREGNANCY POCT - Normal    HCG Qualitative POCT Negative     CBC WITH PLATELETS AND DIFFERENTIAL    WBC Count 8.2      RBC Count 4.33      Hemoglobin 11.9      Hematocrit 35.0      MCV 81      MCH 27.5      MCHC 34.0      RDW 12.3      Platelet Count 301      % Neutrophils 81      % Lymphocytes 12      % Monocytes 7      % Eosinophils 0      % Basophils 0      % Immature Granulocytes 0      NRBCs per 100 WBC 0      Absolute Neutrophils 6.6      Absolute Lymphocytes 1.0      Absolute Monocytes 0.6      Absolute Eosinophils 0.0      Absolute Basophils 0.0      Absolute Immature Granulocytes 0.0      Absolute NRBCs 0.0       Emergency Department Course & Assessments:       Interventions:  Medications   0.9% sodium chloride BOLUS (0 mLs Intravenous Stopped 4/16/23 0647)     Followed by   sodium chloride 0.9% infusion (has no administration in time range)   Enema Compound (docusate/mineral oil/NaPhos) NO MAG CIT PREMIX (226 mLs Rectal $Given 4/16/23 0234)   lidocaine (XYLOCAINE) 2 % external gel 10 mL (10 mLs Topical $Given 4/16/23 0233)        Independent Interpretation (X-rays, CTs, rhythm strip):  Reviewed abdomen XR. This showed nonobstructive pattern    Assessments/Consultations/Discussion of Management or Tests:  ED Course as of 04/16/23 0658   Sun Apr 16, 2023   0037 I obtained the history and examined the patient as noted above.    0149 I rechecked and updated the patient regarding lab results.   0311 I rechecked and updated the patient. She reported interest in opioid detox.   0333 I spoke with the patient's friend once again regarding plan for opioid detox.       Social Determinants of Health affecting care:   None    Disposition:  Care of the patient was transferred to my colleague Dr. Judd pending detox placement.     Impression & Plan    Medical Decision Making:  Lala Calvin is a 37 year old female  who presents for evaluation for decreased stool output without signs of serious  intraabdominal problems. Signs and symptoms are consistent with constipation. There are no signs at this point for a need for rectal disimpaction.  There are no signs of obstruction nor other intraabdominal catastrophe.  There are no indications for advanced imaging or admission.  The patient was given an enema with good output. They will start daily stool softener as well once they are more completely cleaned out.   Separately, the patient reports she recently used Fentanyl, previously managing opioid dependence on Suboxone. She is interested in opioid detox, and we are searching for placement.    Diagnosis:    ICD-10-CM    1. Drug-induced constipation  K59.03       2. Opioid use  F11.90            Scribe Disclosure:  Isabel SIMPSON, am serving as a scribe at 12:50 AM on 4/16/2023 to document services personally performed by Lisbeth Romero MD based on my observations and the provider's statements to me.     4/16/2023   Lisbeth Romero MD Debroux, Karah M, MD  04/16/23 0814

## 2023-04-16 NOTE — DISCHARGE INSTRUCTIONS
Discharge Instructions  Constipation  Constipation can cause severe cramping pain and your provider thinks this might be the cause of your abdominal pain (belly pain) today.  People usually recognize that they are constipated because they have difficulty having bowel movements, are not having bowel movements frequently enough, or are not having large enough bowel movements. Sometimes, especially in children or older people, you do not recognize that you are constipated until it becomes severe. The most common causes of constipation are a lack of exercise and not eating enough fruits, vegetables, and whole grains. Constipation can also be a side effect of medications, such as narcotics, or may be caused by a disease of the digestive system.    Generally, every Emergency Department visit should have a follow-up clinic visit with either a primary or a specialty clinic/provider. Please follow-up as instructed by your emergency provider today. Sometimes, chronic constipation requires further testing to determine the cause. If you are over 50 years old, you may need a colonoscopy if you have not had one before.     Return to the Emergency Department if:  Your abdominal pain worsens or does not improve after a bowel movement.  You become very weak.  You get a temperature above 102oF or as directed by your provider.  You have blood in your stools (bright red or black, tarry stools).  You keep vomiting (throwing up) or cannot drink liquids.  Your see blood when you vomit.  Your stomach gets bloated or bigger.  You have new symptoms or anything that worries you.    What can I do to help myself?  If your provider gave you a cathartic medication, like magnesium citrate or GoLytely  (polyethylene glycol), you can expect to have cramps and gas pains after taking it. You can expect to have a number of bowel movements and even diarrhea (loose or watery stools) in the course of clearing your bowels.  You will know your bowels have  been cleaned out after you pass clear liquid. The cramps and gas should let up after you have emptied your bowels. You may want to wait until morning to take this type of medication so you aren t up in the night.   Sometimes instead of cathartics, we recommend laxatives like milk of magnesia to move your bowels more slowly, or an enema to help the bowels to move. Read and follow the package directions, or follow your provider s instructions.  Once you have become very constipated, it takes time for your bowels to return to normal and you need to be very careful to prevent becoming constipated again. Take a laxative if you do not move your bowels at least every two days.     Eat foods that have a lot of fiber. Good choices are fruits, vegetables, prune juice, apple juice, and high fiber cereal. Limit dairy products such as milk and cheese, since these can make constipation worse.   Drink plenty of water.   When you feel the need to go to the bathroom, go to the bathroom. Do not hold it.  Miralax , Metamucil , Colace , Senna or fiber supplements can be used daily.  Miralax  daily is often the best choice for children.  If you were given a prescription for medicine here today, be sure to read all of the information (including the package insert) that comes with your prescription.  This will include important information about the medicine, its side effects, and any warnings that you need to know about.  The pharmacist who fills the prescription can provide more information and answer questions you may have about the medicine.  If you have questions or concerns that the pharmacist cannot address, please call or return to the Emergency Department.   Remember that you can always come back to the Emergency Department if you are not able to see your regular provider in the amount of time listed above, if you get any new symptoms, or if there is anything that worries you.

## 2023-04-16 NOTE — ED NOTES
Patient is a 37-year-old female who was signed out to me pending clinical sobriety.  She now has metabolized.  She no longer wants detox and instead wishes to be discharged.  Her parents are on the way to pick her up.     Xi Judd MD  04/16/23 3684

## 2023-04-16 NOTE — ED TRIAGE NOTES
Patient presents with complaints of constipation and severe rectal pain, last BM was four days ago. Patient took some stool softeners about an hour ago.       Triage Assessment     Row Name 04/15/23 4432       Triage Assessment (Adult)    Airway WDL WDL       Respiratory WDL    Respiratory WDL WDL       Skin Circulation/Temperature WDL    Skin Circulation/Temperature WDL WDL       Cardiac WDL    Cardiac WDL WDL       Peripheral/Neurovascular WDL    Peripheral Neurovascular WDL WDL       Cognitive/Neuro/Behavioral WDL    Cognitive/Neuro/Behavioral WDL WDL

## 2023-04-16 NOTE — ED NOTES
Pt up to commode and had a large BM. Pt reports it hurt a lot so she had to stop but did have some relief with her bowels. Pt asleep back in bed. Pt still altered, mumbling and cannot complete full sentences.

## 2023-04-16 NOTE — ED NOTES
"RN in to check on patient, she seems a little more awake at this time. Rn updated pt that she has been at the hospital for 8 hours and plans to go to detox around 11. Pt reports \"I am not going to detox, I am going to treatment tomorrow.\" Pt reports she has a bed held at [facility] tomorrow. RN attempted to call this facility ano no answer.   "

## 2023-04-16 NOTE — TELEPHONE ENCOUNTER
Nurse Triage SBAR    Situation:   -ED follow up    Background:   -Patient and mom calling, It is okay to leave a detailed message at this number.   -patient gave consent to commuicate    Assessment:   -seen in ED last night for constipation, gave enema  -takeing senna an gas-ex, purn juice, and water  -now leaking   -rectal pain  -no abdominal pian   -feel like gas I built bu  Per AVS:      Recommendation:   See HCP Within 4 Hours (Or PCP Triage)  [facility]  -Warm transferred to central scheduling to make an appointment for follow up    GABBIE FORD RN on 4/16/2023 at 5:30 PM    Reason for Disposition    [1] Rectal pain or fullness from fecal impaction (rectum full of stool) AND [2] NOT better after SITZ bath, suppository or enema    Additional Information    Negative: [1] Abdomen pain is main symptom AND [2] male    Negative: [1] Abdomen pain is main symptom AND [2] adult female    Negative: Rectal bleeding or blood in stool is main symptom    Negative: Rectal pain or itching is main symptom    Negative: Constipation in a cancer patient who is currently (or recently) receiving chemotherapy or radiation therapy, or cancer patient who has metastatic or end-stage cancer and is receiving palliative care    Negative: Patient sounds very sick or weak to the triager    Negative: [1] Vomiting AND [2] abdomen looks much more swollen than usual    Negative: [1] Vomiting AND [2] contains bile (green color)    Negative: [1] Constant abdominal pain AND [2] present > 2 hours    Protocols used: CONSTIPATION-A-

## 2023-04-17 VITALS
WEIGHT: 135 LBS | HEART RATE: 90 BPM | OXYGEN SATURATION: 99 % | BODY MASS INDEX: 21.19 KG/M2 | HEIGHT: 67 IN | DIASTOLIC BLOOD PRESSURE: 91 MMHG | TEMPERATURE: 98 F | SYSTOLIC BLOOD PRESSURE: 120 MMHG | RESPIRATION RATE: 16 BRPM

## 2023-04-17 PROCEDURE — 250N000013 HC RX MED GY IP 250 OP 250 PS 637: Performed by: EMERGENCY MEDICINE

## 2023-04-17 RX ORDER — BUPRENORPHINE AND NALOXONE 8; 2 MG/1; MG/1
1 FILM, SOLUBLE BUCCAL; SUBLINGUAL ONCE
Status: DISCONTINUED | OUTPATIENT
Start: 2023-04-17 | End: 2023-04-17 | Stop reason: HOSPADM

## 2023-04-17 RX ADMIN — DOCUSATE SODIUM 226 ML: 50 LIQUID ORAL at 00:27

## 2023-04-17 ASSESSMENT — ACTIVITIES OF DAILY LIVING (ADL)
ADLS_ACUITY_SCORE: 35
ADLS_ACUITY_SCORE: 35

## 2023-04-17 NOTE — ED NOTES
Placed 16f holland with 10ML balloon in sterile technique and without difficulty, 1000ml out, holland was clamped for 10 min and then another 1000 ml came out for a total of 200ML.  When entering the room the holland was seen to be out of the bladder with the balloon intact and on the bed.  New holland was again inserted in sterile technique and balloon was again inflated.  The new holland again came out, balloon intact. Currently pt is without holland.  Will continue to assess need for straight cath.  Pt is sleeping in the bed and appears to be comfortable.

## 2023-04-17 NOTE — ED NOTES
Writer entered room and holland catheter was lying on bed.  Writer attempted to reinsert new holland x2 with additional RN.  Catheter would not remain in and no urine return.  Pt reporting bladder spasms during insertion.  Original holland had 2000 mL of rama urine.  No holland in place.  Primary RN updated.

## 2023-04-17 NOTE — ED TRIAGE NOTES
Pt presents for complaint of abdominal pain. Pt states seen yesterday at [facility] for constipation. Enema attempted there with mild success and discharged to home. States since then abdominal pain has worsened and now is having nausea. Pt hyperventilating in triage. Describes rectal pain, burning with urination and abdominal pain. ABC intact. A&Ox4.     Triage Assessment     Row Name 04/16/23 2017       Triage Assessment (Adult)    Airway WDL WDL       Respiratory WDL    Respiratory WDL WDL       Skin Circulation/Temperature WDL    Skin Circulation/Temperature WDL WDL       Cardiac WDL    Cardiac WDL WDL       Peripheral/Neurovascular WDL    Peripheral Neurovascular WDL WDL       Cognitive/Neuro/Behavioral WDL    Cognitive/Neuro/Behavioral WDL WDL

## 2023-04-17 NOTE — ED NOTES
Discharged with prescription and written discharge instructions.  Pt states she is planning to go back to rehab after leaving the hospital.  She states that she feels better now that she has had a BM and her bladder was emptied.  No further questions at this time, pt left via [employer]

## 2023-04-17 NOTE — ED PROVIDER NOTES
History     Chief Complaint:  Abdominal Pain       HPI   Lala Calvin is a 37 year old female with a history of opioid dependence with constipation and now urinary retention.  Was seen yesterday [place] and enema attempted.  States last smoking of fentanyl was two mornings ago.  Long hx of rehab with [facility].    Feels in pain but holland was placed before my arrival and is relieving some of the pain.     Independent Historian:    Parent    Review of External Notes: ER visit 4.15.23     ROS:  Review of Systems   Constitutional: Positive for appetite change. Negative for chills and fever.   Respiratory: Negative for chest tightness and shortness of breath.    Cardiovascular: Negative for chest pain, palpitations and leg swelling.   Gastrointestinal: Positive for abdominal pain, constipation and nausea. Negative for diarrhea and vomiting.   Genitourinary: Positive for decreased urine volume and dysuria.   Musculoskeletal: Positive for back pain.   Skin: Negative for rash.   All other systems reviewed and are negative.      Allergies:  No Known Drug Allergies  Ragweeds  Seasonal Allergies     Medications:    polyethylene glycol (GOLYTELY) 236 g suspension  buprenorphine (SUBUTEX) 8 MG SUBL sublingual tablet  buprenorphine HCl-naloxone HCl (SUBOXONE) 8-2 MG per film  escitalopram (LEXAPRO) 20 MG tablet  ferrous sulfate (FEROSUL) 325 (65 Fe) MG tablet  gabapentin (NEURONTIN) 300 MG capsule  ibuprofen (ADVIL/MOTRIN) 200 MG capsule  melatonin 3 MG tablet  multivitamin, therapeutic (MULTIVITAMIN) TABS tablet  naloxone (NARCAN) 4 MG/0.1ML nasal spray  nicotine (NICODERM CQ) 14 MG/24HR 24 hr patch  polyethylene glycol 3350 POWD  senna (SENOKOT) 8.6 MG tablet  traZODone (DESYREL) 50 MG tablet        Past Medical History:    Past Medical History:   Diagnosis Date     ADHD (attention deficit hyperactivity disorder)      Anorexia nervosa, binge-eating purging type      Anxiety      Depressive disorder      PCOS (polycystic  "ovarian syndrome)      Substance abuse (H)        Past Surgical History:    Past Surgical History:   Procedure Laterality Date     WISDOM TOOTH EXTRACTION          Family History:    family history includes Cancer in her maternal grandmother; Heart Failure in her maternal grandmother; Hypertension in her mother; Mental Illness in her paternal grandfather.    Social History:   reports that she has quit smoking. Her smoking use included vaping device. She has never used smokeless tobacco. She reports current alcohol use of about 3.0 - 4.0 standard drinks of alcohol per week. She reports current drug use. Drugs: Fentanyl, Opiates, and Other.  PCP: No Ref-Primary, Physician     Physical Exam     Patient Vitals for the past 24 hrs:   BP Temp Pulse Resp SpO2 Height Weight   04/16/23 2017 128/71 -- -- -- -- -- --   04/16/23 2016 -- 98.2  F (36.8  C) 102 16 99 % 1.702 m (5' 7\") 61.2 kg (135 lb)        Physical Exam  Constitutional: Patient is deconditioned below weight and appears like a chronic opioid user.    Head: Atraumatic.  Mouth/Throat: Oropharynx is clear and moist.   Eyes: Conjunctivae and EOM are normal. No scleral icterus.  Neck: Normal range of motion. Neck supple.   Cardiovascular: Normal rate, regular rhythm, normal heart sounds and intact distal perfusion.   Pulmonary/Chest: Breath sounds normal. No respiratory distress.  Abdominal: Somewhat hard. Bowel sounds are decreased. Slight distension. Slight tenderness. No rebound or guarding.   Musculoskeletal: Normal range of motion. No edema or tenderness.   Neurological: Alert and orientated to person, place, and time. No observable focal neuro deficit  Skin: Warm and dry. No rash noted. Not diaphoretic.     Emergency Department Course       Imaging:  CT Abdomen Pelvis w Contrast   Final Result   IMPRESSION:    1.  There is some loss of detail on this study given motion artifact, streak artifact given the patient's arms are crossed anterior to the pelvis, and " lack of significant intraperitoneal fat.      2.  Moderate obstipation to include significant stool in the distal colon and rectum. No evidence for colitis seen.      3.  Mildly prominent intrahepatic bile ducts if there are clinical/laboratory findings worrisome for biliary tract obstruction, MRCP is recommended for further evaluation.      4.  The gallbladder is contracted and this may be secondary to nonfasting status the patient, given the appearance of the stomach.              Report per radiology    Laboratory:  Labs Ordered and Resulted from Time of ED Arrival to Time of ED Departure   COMPREHENSIVE METABOLIC PANEL - Abnormal       Result Value    Sodium 139      Potassium 3.6      Chloride 103      Carbon Dioxide (CO2) 25      Anion Gap 11      Urea Nitrogen 15.0      Creatinine 0.61      Calcium 9.3      Glucose 112 (*)     Alkaline Phosphatase 62      AST 31      ALT 27      Protein Total 7.4      Albumin 4.2      Bilirubin Total 0.3      GFR Estimate >90     ROUTINE UA WITH MICROSCOPIC REFLEX TO CULTURE - Abnormal    Color Urine Light Yellow      Appearance Urine Clear      Glucose Urine Negative      Bilirubin Urine Negative      Ketones Urine Negative      Specific Gravity Urine 1.013      Blood Urine Negative      pH Urine 7.0      Protein Albumin Urine Negative      Urobilinogen Urine Normal      Nitrite Urine Negative      Leukocyte Esterase Urine Negative      Mucus Urine Present (*)     RBC Urine 0      WBC Urine <1     CBC WITH PLATELETS AND DIFFERENTIAL - Abnormal    WBC Count 9.2      RBC Count 3.83      Hemoglobin 10.6 (*)     Hematocrit 31.9 (*)     MCV 83      MCH 27.7      MCHC 33.2      RDW 13.0      Platelet Count 230      % Neutrophils 73      % Lymphocytes 15      % Monocytes 11      % Eosinophils 1      % Basophils 0      % Immature Granulocytes 0      NRBCs per 100 WBC 0      Absolute Neutrophils 6.7      Absolute Lymphocytes 1.4      Absolute Monocytes 1.0      Absolute Eosinophils  0.1      Absolute Basophils 0.0      Absolute Immature Granulocytes 0.0      Absolute NRBCs 0.0     DRUG ABUSE SCREEN 1 URINE (ED) - Abnormal    Amphetamines Urine Screen Positive (*)     Barbituates Urine Screen Negative      Benzodiazepine Urine Screen Negative      Cannabinoids Urine Screen Negative      Cocaine Urine Screen Negative      Opiates Urine Screen Negative     LIPASE - Normal    Lipase 45     HCG QUALITATIVE URINE - Normal    hCG Urine Qualitative Negative          Procedures       Emergency Department Course & Assessments:             Interventions:  Medications   0.9% sodium chloride BOLUS (1,000 mLs Intravenous $New Bag 4/16/23 2222)     Followed by   sodium chloride 0.9% infusion (has no administration in time range)   ondansetron (ZOFRAN) injection 4 mg (4 mg Intravenous $Given 4/16/23 2221)   Enema Compound (docusate/mineral oil/NaPhos) NO MAG CIT PREMIX (has no administration in time range)   iopamidol (ISOVUE-370) solution 500 mL (68 mLs Intravenous $Given 4/16/23 2307)   sodium chloride (PF) 0.9% PF flush 100 mL (57 mLs Intravenous $Given 4/16/23 2307)        Independent Interpretation (X-rays, CTs, rhythm strip):       Consultations/Discussion of Management or Tests:          Social Determinants of Health affecting care:  Opioid dependence    Assessments:      Disposition:  Care transitioned expect home after enemas.     Impression & Plan        Medical Decision Making:  Pt has opioid abuse complicating obstipation and urinary retention.  No renal failure.  No vomiting.  Relieved with holland catheter.  Needs enemas and clearing of bowel and stool.   Once clearing needs rehab and has established previous and mom that can help arrange this tomorrow.    Diagnosis:    ICD-10-CM    1. Opioid abuse (H)  F11.10       2. Obstipation  K59.00       3. Urinary retention with incomplete bladder emptying  R33.9            Discharge Medications:  New Prescriptions    POLYETHYLENE GLYCOL (GOLYTELY) 236 G  SUSPENSION    Take 4,000 mLs by mouth once for 1 dose Take 8oz every hour until having bowel movements              4/16/2023   Javi Bailon MD Stevens, Andrew C, MD  04/17/23 0004

## 2023-04-18 ENCOUNTER — NURSE TRIAGE (OUTPATIENT)
Dept: NURSING | Facility: CLINIC | Age: 38
End: 2023-04-18

## 2023-04-18 ENCOUNTER — E-VISIT (OUTPATIENT)
Dept: URGENT CARE | Facility: CLINIC | Age: 38
End: 2023-04-18
Payer: COMMERCIAL

## 2023-04-18 ENCOUNTER — LAB (OUTPATIENT)
Dept: LAB | Facility: CLINIC | Age: 38
End: 2023-04-18
Payer: COMMERCIAL

## 2023-04-18 DIAGNOSIS — R30.0 DYSURIA: Primary | ICD-10-CM

## 2023-04-18 DIAGNOSIS — N39.0 URINARY TRACT INFECTION WITHOUT HEMATURIA, SITE UNSPECIFIED: Primary | ICD-10-CM

## 2023-04-18 DIAGNOSIS — R30.0 DYSURIA: ICD-10-CM

## 2023-04-18 LAB
ALBUMIN UR-MCNC: >=300 MG/DL
APPEARANCE UR: ABNORMAL
BACTERIA #/AREA URNS HPF: ABNORMAL /HPF
BILIRUB UR QL STRIP: ABNORMAL
CAOX CRY #/AREA URNS HPF: ABNORMAL /HPF
COLOR UR AUTO: ABNORMAL
GLUCOSE UR STRIP-MCNC: NEGATIVE MG/DL
HGB UR QL STRIP: ABNORMAL
KETONES UR STRIP-MCNC: ABNORMAL MG/DL
LEUKOCYTE ESTERASE UR QL STRIP: ABNORMAL
NITRATE UR QL: POSITIVE
PH UR STRIP: 6.5 [PH] (ref 5–7)
RBC #/AREA URNS AUTO: ABNORMAL /HPF
SP GR UR STRIP: >=1.03 (ref 1–1.03)
SQUAMOUS #/AREA URNS AUTO: ABNORMAL /LPF
UROBILINOGEN UR STRIP-ACNC: 1 E.U./DL
WBC #/AREA URNS AUTO: ABNORMAL /HPF

## 2023-04-18 PROCEDURE — 87086 URINE CULTURE/COLONY COUNT: CPT

## 2023-04-18 PROCEDURE — 87186 SC STD MICRODIL/AGAR DIL: CPT

## 2023-04-18 PROCEDURE — 99421 OL DIG E/M SVC 5-10 MIN: CPT | Performed by: PREVENTIVE MEDICINE

## 2023-04-18 PROCEDURE — 81001 URINALYSIS AUTO W/SCOPE: CPT

## 2023-04-18 RX ORDER — NITROFURANTOIN 25; 75 MG/1; MG/1
100 CAPSULE ORAL 2 TIMES DAILY
Qty: 10 CAPSULE | Refills: 0 | Status: SHIPPED | OUTPATIENT
Start: 2023-04-18 | End: 2023-04-19

## 2023-04-18 NOTE — TELEPHONE ENCOUNTER
S: Pt calling. She states that she had an evist for UTI and was advised to go to lab for UA/UC but states that she tried to make a lab only appt and could not.     B: Spoke with scheduling and they need an order from the provider to sched lab only appt.    A: Advised pt to sched lab appt and transferred her to sched who did sched lab only appt today @ 5 PM  in [place] that [facility] would obtain order for UA/UC.    R: Please place order for UA/UC or contact pt to further advise.     Ladonna Schumacher, RN, BSN  [facility] Nurse Advisor 3:45 PM 4/18/2023        Reason for Disposition    Nursing judgment    Additional Information    Negative: Nursing judgment    Negative: Nursing judgment    Negative: Patient wants to be seen    Negative: Nursing judgment    Negative: Nursing judgment    Negative: Nursing judgment    Negative: Sounds like a life-threatening emergency to the triager    Negative: Information only call about a Well Adult (no illness or injury)    Negative: Caller can't be reached by phone    Negative: Nursing judgment    Negative: Nursing judgment    Negative: Nursing judgment    Protocols used: NO PROTOCOL [id number]

## 2023-04-18 NOTE — PATIENT INSTRUCTIONS
Dear Lala Calvin,     After reviewing your responses, I would like you to come in for a urine test to make sure we treat you correctly. This urine test is to evaluate you for a possible urinary tract infection, and should be scheduled for today or tomorrow. Schedule a Lab Only appointment here.     Lab appointments are not available at most locations on the weekends. If no Lab Only appointment is available, you should be seen in any of our convenient Walk-in or Urgent Care Centers, which can be found on our website here.     You will receive instructions with your results in [employer] once they are available.     If your symptoms worsen, you develop pain in your back or stomach, develop fevers, or are not improving in 5 days, please contact your primary care provider for an appointment or visit a Walk-in or Urgent Care Center to be seen.     Thanks again for choosing us as your health care partner,     Carl Connors MD, MD    Dysuria with Uncertain Cause (Adult)    The urethra is the tube that allows urine to pass out of the body. In a woman, the urethra is the opening above the vagina. In men, the urethra is the opening on the tip of the penis. Dysuria is the feeling of pain or burning in the urethra when passing urine.   Dysuria can be caused by anything that irritates or inflames the urethra. An infection or chemical irritation can cause this reaction. A bladder infection is the most common cause of dysuria in adults. A urine test can diagnose this. A bladder infection needs antibiotic treatment.   Soaps, lotions, colognes, and feminine hygiene products can cause dysuria. So can birth control jellies, creams, and foams. It will go away 1 to 3 days after discontinuing the use of these irritants.   Sexually transmitted infections (STIs), such as chlamydia or gonorrhea, can cause dysuria. Your healthcare provider may take a culture sample. Your provider may start you on antibiotic medicine  before the culture test returns.   In women who have gone through menopause, dysuria can be from dryness in the lining of the urethra. This can be treated with hormones. Dysuria becomes long-term (chronic) when it lasts for weeks or months. You may need to see a specialist (urologist) to diagnose and treat chronic dysuria.   Home care  These home care tips may help:    Don't use any chemicals or products that you think may be causing your symptoms.    If you were given a prescription medicine, take as directed. Take it until it's all used up.    If a culture was taken, don't have sex until you have been told that it is negative. A negative culture means you don't have an infection. Then follow your healthcare provider's advice to treat your condition.  If a culture was done and it is positive:     Both you and your sexual partner may need to be treated. This is true even if your partner has no symptoms.    Contact your healthcare provider or go to an urgent care clinic or the public health department to be looked at and treated.    Don't have sex until both you and your partner have finished all antibiotics and your healthcare provider says you are no longer contagious.    Learn about and use safe sex practices. The safest sex is with a partner who has tested negative and only has sex with you. Condoms can prevent STIs from spreading, but they aren't a guarantee.  Follow-up care  Follow up with your healthcare provider, or as advised. If a culture was taken, you may call as directed for the results. If you have an STI, follow up with your provider or the public health department for a complete STI screening, including HIV testing. For more information, contact CDC-INFO at 464-260-2030.   When to call your healthcare provider   Call your healthcare provider right away if any of these occur:    You aren't better after 3 days of treatment    Fever of 100.4 F (38 C) or higher, or as directed by your healthcare  provider    Back or belly pain that gets worse    You can't urinate because of pain    New discharge from the urethra, vagina, or penis    Painful sores on the penis    Rash or joint pain    Painful lumps (lymph nodes) in the groin    Testicle pain or swelling of the scrotum  Amy last reviewed this educational content on 6/1/2022 2000-2022 [employer]. All rights reserved. This information is not intended as a substitute for professional medical care. Always follow your healthcare professional's instructions.

## 2023-04-19 ENCOUNTER — NURSE TRIAGE (OUTPATIENT)
Dept: NURSING | Facility: CLINIC | Age: 38
End: 2023-04-19
Payer: COMMERCIAL

## 2023-04-19 DIAGNOSIS — N39.0 URINARY TRACT INFECTION WITHOUT HEMATURIA, SITE UNSPECIFIED: Primary | ICD-10-CM

## 2023-04-19 LAB — BACTERIA UR CULT: ABNORMAL

## 2023-04-19 RX ORDER — NITROFURANTOIN 25; 75 MG/1; MG/1
100 CAPSULE ORAL 2 TIMES DAILY
Qty: 10 CAPSULE | Refills: 0 | Status: SHIPPED | OUTPATIENT
Start: 2023-04-19

## 2023-04-19 NOTE — TELEPHONE ENCOUNTER
Asking to have Rx for nitrofurantoin send to [facility] in [employer] on [address] in [place]. Had been sent to [facility] in [facility].  I called [place] [facility] and this has not been processed.  Will send to the above requested pharmacy.    Jessa GONZALEZ RN [facility]

## 2023-06-01 ENCOUNTER — HEALTH MAINTENANCE LETTER (OUTPATIENT)
Age: 38
End: 2023-06-01

## 2023-06-13 ENCOUNTER — HOSPITAL ENCOUNTER (EMERGENCY)
Facility: CLINIC | Age: 38
Discharge: HOME | End: 2023-06-13
Attending: EMERGENCY MEDICINE | Admitting: EMERGENCY MEDICINE
Payer: COMMERCIAL

## 2023-06-13 ENCOUNTER — APPOINTMENT (OUTPATIENT)
Dept: GENERAL RADIOLOGY | Facility: CLINIC | Age: 38
End: 2023-06-13
Attending: EMERGENCY MEDICINE
Payer: COMMERCIAL

## 2023-06-13 VITALS
RESPIRATION RATE: 16 BRPM | BODY MASS INDEX: 20.4 KG/M2 | WEIGHT: 130 LBS | TEMPERATURE: 97.6 F | DIASTOLIC BLOOD PRESSURE: 80 MMHG | SYSTOLIC BLOOD PRESSURE: 118 MMHG | HEART RATE: 105 BPM | HEIGHT: 67 IN | OXYGEN SATURATION: 100 %

## 2023-06-13 DIAGNOSIS — S61.011A LACERATION OF RIGHT THUMB WITHOUT FOREIGN BODY WITHOUT DAMAGE TO NAIL, INITIAL ENCOUNTER: ICD-10-CM

## 2023-06-13 PROCEDURE — 99284 EMERGENCY DEPT VISIT MOD MDM: CPT

## 2023-06-13 PROCEDURE — 73140 X-RAY EXAM OF FINGER(S): CPT | Mod: RT

## 2023-06-13 PROCEDURE — 12002 RPR S/N/AX/GEN/TRNK2.6-7.5CM: CPT

## 2023-06-13 ASSESSMENT — ACTIVITIES OF DAILY LIVING (ADL): ADLS_ACUITY_SCORE: 35

## 2023-06-14 ENCOUNTER — NURSE TRIAGE (OUTPATIENT)
Dept: NURSING | Facility: CLINIC | Age: 38
End: 2023-06-14
Payer: COMMERCIAL

## 2023-06-14 NOTE — ED TRIAGE NOTES
Cut right thumb on broken glass. Last tetanus 2022.     Triage Assessment     Row Name 06/13/23 1907       Triage Assessment (Adult)    Airway WDL WDL       Respiratory WDL    Respiratory WDL WDL       Skin Circulation/Temperature WDL    Skin Circulation/Temperature WDL X  laceration to right thumb       Cardiac WDL    Cardiac WDL WDL       Peripheral/Neurovascular WDL    Peripheral Neurovascular WDL WDL       Cognitive/Neuro/Behavioral WDL    Cognitive/Neuro/Behavioral WDL WDL               yes

## 2023-06-14 NOTE — ED PROVIDER NOTES
"  History     Chief Complaint:  Laceration     The history is provided by the patient.      Lala Calvin is a 37 year old female with a history hypertension, ADHD, and and substance use disorder of who presents to the ED for evaluation of laceration. Her mom found a broken glass pipe that she uses to smoke. She cut her thumb on it. No numbness or trouble moving the R thumb.     Independent Historian:   None - Patient Only      Medications:    Subutex  Inderal  Narcan  Nordette  Revia  Tylenol  Lexapro  suboxone  Lexapro  Ferrous sulfate  Ibuprofen  Melatonin  Senna  Wellbutrin  Zoloft  Klonopn    Past Medical History:    ADHD  Anorexia nervosa, binge-eating purging type  Anxiety  Depressive disorder  PCOS  Amphetamine abuse  Alcohol use disorder  Chemical dependency (meth, heroin, alcohol)  PTSD  Bipolar I disorder  Plantar fascial fibromatosis  Gonorrhea  UTI  Opioid overdose x4  Hypertension    Past Surgical History:    [place] teeth extraction     Physical Exam     Patient Vitals for the past 24 hrs:   BP Temp Temp src Pulse Resp SpO2 Height Weight   06/13/23 1907 118/80 97.6  F (36.4  C) Oral 105 16 100 % 1.702 m (5' 7\") 59 kg (130 lb)        Physical Exam  VS: Reviewed per above  HENT: normal speech  EYES: sclera anicteric  CV: Rate as noted, regular rhythm.   RESP: Effort normal.   NEURO: Alert, moving all extremities  MSK: No deformity of the extremities, right thumb palmar laceration that is 4.5 cm.  No obvious foreign body or tendinous injury appreciated.  Full range of motion with right thumb flexion and extension and opposition and abduction/abduction.  SKIN: Warm and dry      Emergency Department Course   Imaging:  XR Finger Right G/E 2 Views   Final Result   IMPRESSION: Normal joint spaces and alignment. No fracture. There is soft tissue laceration over the volar aspect of the thumb. No radiodense foreign body is seen.            Report per radiology     Procedures     Laceration Repair  "     Procedure: Laceration Repair    Indication: Laceration    Consent: Verbal    Location: Right Thumb    Length: 4.5 cm    Preparation: Irrigation with Sterile Saline.    Anesthesia/Sedation: Bupivacaine - 0.5%      Treatment/Exploration: Wound explored, no foreign bodies found     Closure: The wound was closed with one layer. Skin/superficial layer was closed with 10 x 4-0 Nylon using Interrupted sutures.     Patient Status: The patient tolerated the procedure well: Yes. There were no complications.    The above procedure was performed by Damaris Jiménez PA-C.    Emergency Department Course & Assessments:     Interventions:  None    Assessments:  1730 I obtained the history and examined the patient as noted above  2047 I rechecked and updated the patient    Independent Interpretation (X-rays, CTs, rhythm strip):  None    Consultations/Discussion of Management or Tests:  None      Social Determinants of Health affecting care:   None    Disposition:  The patient was discharged to home.     Impression & Plan    Medical Decision Making:  Patient presents to the ER for evaluation of right thumb laceration.  Vital signs reassuring.  X-ray without fracture or dislocation or foreign body.  No signs of obvious tendon injury or neurovascular injury in the thumb distally.  Laceration was repaired per PA on the case per procedure note above.  Plan for suture removal in 10 days.  Wound was dressed with bandage and AlumaFoam splint.  Wound care was discussed.  Return precautions discussed as well.    Diagnosis:    ICD-10-CM    1. Laceration of right thumb without foreign body without damage to nail, initial encounter  S61.011A           Scribe Disclosure:  Isabel SIMPSON, am serving as a scribe at 8:46 PM on 6/13/2023 to document services personally performed by Barry Watson MD based on my observations and the provider's statements to me.    Scribe Disclosure:  Oumar SIMPSON, am serving as a scribe at 7:57 PM on 6/13/2023  to document services personally performed by Barry Watson MD based on my observations and the provider's statements to me.     6/13/2023   Barry Watson MD Lindenbaum, Elan, MD  06/13/23 [id number]

## 2023-06-15 NOTE — TELEPHONE ENCOUNTER
Triage Call:    Patient calling with questions related to dressing on laceration.  She was treated in ED yesterday for thumb laceration.  She reports that the dressing keeps falling off. Patient denies increase in pain, swelling, redness, or pus. Reviewed AVS with patient and advised to wash 2x/day, apply antibiotic ointment, and dress.  Informed patient when to call back.    Shaniqua Peck RN  06/14/23 8:57 PM  [facility] Nurse Advisor    Reason for Disposition    Health Information question, no triage required and triager able to answer question    Additional Information    Negative: [1] Caller is not with the adult (patient) AND [2] reporting urgent symptoms    Negative: Lab result questions    Negative: Medication questions    Negative: Caller can't be reached by phone    Negative: Caller has already spoken to PCP or another triager    Negative: RN needs further essential information from caller in order to complete triage    Negative: Requesting regular office appointment    Negative: [1] Caller requesting NON-URGENT health information AND [2] PCP's office is the best resource    Protocols used: INFORMATION ONLY CALL - NO TRIAGE-A-

## 2023-06-27 ENCOUNTER — OFFICE VISIT (OUTPATIENT)
Dept: URGENT CARE | Facility: URGENT CARE | Age: 38
End: 2023-06-27
Payer: COMMERCIAL

## 2023-06-27 DIAGNOSIS — Z51.89 VISIT FOR WOUND CHECK: Primary | ICD-10-CM

## 2023-06-27 PROCEDURE — 99207 PR NO CHARGE NURSE ONLY: CPT

## 2023-06-27 NOTE — PROGRESS NOTES
Patient presents with:  Suture Removal: On right thumb, had placed at [facility] 6/13/2023    10 sutures placed.   Skin is macerated and wound edges are still slightly open.    Wound cleaned with surgiclense and bacitracin applied.      Leave open to air and return in 3 days for suture removal

## 2024-06-02 ENCOUNTER — HEALTH MAINTENANCE LETTER (OUTPATIENT)
Age: 39
End: 2024-06-02

## 2025-06-15 ENCOUNTER — HEALTH MAINTENANCE LETTER (OUTPATIENT)
Age: 40
End: 2025-06-15